# Patient Record
Sex: FEMALE | Race: WHITE | Employment: FULL TIME | URBAN - METROPOLITAN AREA
[De-identification: names, ages, dates, MRNs, and addresses within clinical notes are randomized per-mention and may not be internally consistent; named-entity substitution may affect disease eponyms.]

---

## 2018-08-24 ENCOUNTER — APPOINTMENT (OUTPATIENT)
Dept: GENERAL RADIOLOGY | Age: 28
End: 2018-08-24
Attending: EMERGENCY MEDICINE
Payer: OTHER GOVERNMENT

## 2018-08-24 ENCOUNTER — HOSPITAL ENCOUNTER (EMERGENCY)
Age: 28
Discharge: HOME | End: 2018-08-24
Attending: EMERGENCY MEDICINE
Payer: OTHER GOVERNMENT

## 2018-08-24 ENCOUNTER — APPOINTMENT (OUTPATIENT)
Dept: CT IMAGING | Age: 28
End: 2018-08-24
Attending: EMERGENCY MEDICINE
Payer: OTHER GOVERNMENT

## 2018-08-24 VITALS
DIASTOLIC BLOOD PRESSURE: 60 MMHG | BODY MASS INDEX: 21.16 KG/M2 | HEART RATE: 97 BPM | WEIGHT: 115 LBS | HEIGHT: 62 IN | OXYGEN SATURATION: 100 % | RESPIRATION RATE: 16 BRPM | SYSTOLIC BLOOD PRESSURE: 107 MMHG | TEMPERATURE: 98.7 F

## 2018-08-24 DIAGNOSIS — R10.13 ABDOMINAL PAIN, EPIGASTRIC: Primary | ICD-10-CM

## 2018-08-24 LAB
ALBUMIN SERPL-MCNC: 4.2 G/DL (ref 3.4–5)
ALBUMIN/GLOB SERPL: 1.4 {RATIO} (ref 0.8–1.7)
ALP SERPL-CCNC: 55 U/L (ref 45–117)
ALT SERPL-CCNC: 46 U/L (ref 13–56)
ANION GAP SERPL CALC-SCNC: 7 MMOL/L (ref 3–18)
APPEARANCE UR: ABNORMAL
AST SERPL-CCNC: 23 U/L (ref 15–37)
BACTERIA URNS QL MICRO: ABNORMAL /HPF
BASOPHILS # BLD: 0 K/UL (ref 0–0.1)
BASOPHILS NFR BLD: 0 % (ref 0–2)
BILIRUB DIRECT SERPL-MCNC: 0.2 MG/DL (ref 0–0.2)
BILIRUB SERPL-MCNC: 0.7 MG/DL (ref 0.2–1)
BILIRUB UR QL: NEGATIVE
BUN SERPL-MCNC: 10 MG/DL (ref 7–18)
BUN/CREAT SERPL: 13 (ref 12–20)
CALCIUM SERPL-MCNC: 8.4 MG/DL (ref 8.5–10.1)
CHLORIDE SERPL-SCNC: 110 MMOL/L (ref 100–108)
CO2 SERPL-SCNC: 27 MMOL/L (ref 21–32)
COLOR UR: ABNORMAL
CREAT SERPL-MCNC: 0.76 MG/DL (ref 0.6–1.3)
DIFFERENTIAL METHOD BLD: ABNORMAL
EOSINOPHIL # BLD: 0 K/UL (ref 0–0.4)
EOSINOPHIL NFR BLD: 0 % (ref 0–5)
EPITH CASTS URNS QL MICRO: ABNORMAL /LPF (ref 0–5)
ERYTHROCYTE [DISTWIDTH] IN BLOOD BY AUTOMATED COUNT: 13.4 % (ref 11.6–14.5)
GLOBULIN SER CALC-MCNC: 3.1 G/DL (ref 2–4)
GLUCOSE SERPL-MCNC: 83 MG/DL (ref 74–99)
GLUCOSE UR STRIP.AUTO-MCNC: NEGATIVE MG/DL
HCG UR QL: NEGATIVE
HCT VFR BLD AUTO: 44.5 % (ref 35–45)
HGB BLD-MCNC: 14.9 G/DL (ref 12–16)
HGB UR QL STRIP: ABNORMAL
KETONES UR QL STRIP.AUTO: NEGATIVE MG/DL
LEUKOCYTE ESTERASE UR QL STRIP.AUTO: ABNORMAL
LIPASE SERPL-CCNC: 187 U/L (ref 73–393)
LYMPHOCYTES # BLD: 0.6 K/UL (ref 0.9–3.6)
LYMPHOCYTES NFR BLD: 6 % (ref 21–52)
MCH RBC QN AUTO: 29.4 PG (ref 24–34)
MCHC RBC AUTO-ENTMCNC: 33.5 G/DL (ref 31–37)
MCV RBC AUTO: 87.9 FL (ref 74–97)
MONOCYTES # BLD: 0.8 K/UL (ref 0.05–1.2)
MONOCYTES NFR BLD: 8 % (ref 3–10)
MUCOUS THREADS URNS QL MICRO: ABNORMAL /LPF
NEUTS SEG # BLD: 8.9 K/UL (ref 1.8–8)
NEUTS SEG NFR BLD: 86 % (ref 40–73)
NITRITE UR QL STRIP.AUTO: NEGATIVE
PH UR STRIP: 5.5 [PH] (ref 5–8)
PLATELET # BLD AUTO: 178 K/UL (ref 135–420)
PMV BLD AUTO: 10.6 FL (ref 9.2–11.8)
POTASSIUM SERPL-SCNC: 3.7 MMOL/L (ref 3.5–5.5)
PROT SERPL-MCNC: 7.3 G/DL (ref 6.4–8.2)
PROT UR STRIP-MCNC: ABNORMAL MG/DL
RBC # BLD AUTO: 5.06 M/UL (ref 4.2–5.3)
RBC #/AREA URNS HPF: ABNORMAL /HPF (ref 0–5)
SODIUM SERPL-SCNC: 144 MMOL/L (ref 136–145)
SP GR UR REFRACTOMETRY: 1.03 (ref 1–1.03)
UROBILINOGEN UR QL STRIP.AUTO: 0.2 EU/DL (ref 0.2–1)
WBC # BLD AUTO: 10.3 K/UL (ref 4.6–13.2)
WBC URNS QL MICRO: ABNORMAL /HPF (ref 0–4)

## 2018-08-24 PROCEDURE — 74011250637 HC RX REV CODE- 250/637: Performed by: EMERGENCY MEDICINE

## 2018-08-24 PROCEDURE — 74011000250 HC RX REV CODE- 250: Performed by: EMERGENCY MEDICINE

## 2018-08-24 PROCEDURE — 74022 RADEX COMPL AQT ABD SERIES: CPT

## 2018-08-24 PROCEDURE — 80076 HEPATIC FUNCTION PANEL: CPT | Performed by: EMERGENCY MEDICINE

## 2018-08-24 PROCEDURE — 83690 ASSAY OF LIPASE: CPT | Performed by: EMERGENCY MEDICINE

## 2018-08-24 PROCEDURE — 85025 COMPLETE CBC W/AUTO DIFF WBC: CPT | Performed by: EMERGENCY MEDICINE

## 2018-08-24 PROCEDURE — 80048 BASIC METABOLIC PNL TOTAL CA: CPT | Performed by: EMERGENCY MEDICINE

## 2018-08-24 PROCEDURE — 81001 URINALYSIS AUTO W/SCOPE: CPT | Performed by: EMERGENCY MEDICINE

## 2018-08-24 PROCEDURE — 99284 EMERGENCY DEPT VISIT MOD MDM: CPT

## 2018-08-24 PROCEDURE — 74177 CT ABD & PELVIS W/CONTRAST: CPT

## 2018-08-24 PROCEDURE — 74011636320 HC RX REV CODE- 636/320: Performed by: EMERGENCY MEDICINE

## 2018-08-24 PROCEDURE — 96374 THER/PROPH/DIAG INJ IV PUSH: CPT

## 2018-08-24 PROCEDURE — 81025 URINE PREGNANCY TEST: CPT

## 2018-08-24 PROCEDURE — 96375 TX/PRO/DX INJ NEW DRUG ADDON: CPT

## 2018-08-24 PROCEDURE — 74011250636 HC RX REV CODE- 250/636: Performed by: EMERGENCY MEDICINE

## 2018-08-24 RX ORDER — FAMOTIDINE 20 MG/1
20 TABLET, FILM COATED ORAL 2 TIMES DAILY
Qty: 20 TAB | Refills: 0 | Status: SHIPPED | OUTPATIENT
Start: 2018-08-24

## 2018-08-24 RX ORDER — FENTANYL CITRATE 50 UG/ML
50 INJECTION, SOLUTION INTRAMUSCULAR; INTRAVENOUS
Status: COMPLETED | OUTPATIENT
Start: 2018-08-24 | End: 2018-08-24

## 2018-08-24 RX ORDER — SUCRALFATE 1 G/10ML
1 SUSPENSION ORAL 4 TIMES DAILY
Qty: 414 ML | Refills: 0 | Status: SHIPPED | OUTPATIENT
Start: 2018-08-24

## 2018-08-24 RX ORDER — ONDANSETRON 4 MG/1
4 TABLET, ORALLY DISINTEGRATING ORAL
Qty: 15 TAB | Refills: 0 | Status: SHIPPED | OUTPATIENT
Start: 2018-08-24

## 2018-08-24 RX ORDER — MAG HYDROX/ALUMINUM HYD/SIMETH 200-200-20
30 SUSPENSION, ORAL (FINAL DOSE FORM) ORAL
Status: DISCONTINUED | OUTPATIENT
Start: 2018-08-24 | End: 2018-08-24 | Stop reason: HOSPADM

## 2018-08-24 RX ORDER — LIDOCAINE HYDROCHLORIDE 20 MG/ML
15 SOLUTION OROPHARYNGEAL AS NEEDED
Status: DISCONTINUED | OUTPATIENT
Start: 2018-08-24 | End: 2018-08-24 | Stop reason: HOSPADM

## 2018-08-24 RX ORDER — ONDANSETRON 2 MG/ML
4 INJECTION INTRAMUSCULAR; INTRAVENOUS
Status: COMPLETED | OUTPATIENT
Start: 2018-08-24 | End: 2018-08-24

## 2018-08-24 RX ADMIN — FENTANYL CITRATE 50 MCG: 50 INJECTION, SOLUTION INTRAMUSCULAR; INTRAVENOUS at 09:58

## 2018-08-24 RX ADMIN — IOPAMIDOL 71 ML: 612 INJECTION, SOLUTION INTRAVENOUS at 10:33

## 2018-08-24 RX ADMIN — ONDANSETRON 4 MG: 2 INJECTION, SOLUTION INTRAMUSCULAR; INTRAVENOUS at 08:17

## 2018-08-24 RX ADMIN — LIDOCAINE HYDROCHLORIDE 15 ML: 20 SOLUTION ORAL; TOPICAL at 08:45

## 2018-08-24 RX ADMIN — ALUMINUM HYDROXIDE, MAGNESIUM HYDROXIDE, AND SIMETHICONE 30 ML: 200; 200; 20 SUSPENSION ORAL at 08:45

## 2018-08-24 NOTE — DISCHARGE INSTRUCTIONS

## 2018-08-24 NOTE — ED TRIAGE NOTES
Patient reports abdominal pain that started at 2am. She stated she has had it before when she had an ulcer.

## 2018-08-24 NOTE — LETTER
NOTIFICATION RETURN TO WORK / SCHOOL 
 
8/24/2018 11:22 AM 
 
Ms. Dhruv Mitchell [address] [address] To Whom It May Concern: 
 
Carter Nettles is currently under the care of [facility] EMERGENCY DEPT. She will return to work/school on: 8/28/2018 If there are questions or concerns please have the patient contact our office. Sincerely, Carey Jordan PA-C

## 2018-08-24 NOTE — ED PROVIDER NOTES
EMERGENCY DEPARTMENT HISTORY AND PHYSICAL EXAM    8:48 AM      Date: 8/24/2018  Patient Name: Brian Mitchell    History of Presenting Illness     Chief Complaint   Patient presents with    Abdominal Pain         History Provided By: Patient    Chief Complaint: abd pain  Duration:  Hours PTA  Timing:  Waxing and Waning  Location: LUQ  Quality: n/a  Severity: Severe  Modifying Factors: No modifying or aggravating factors were reported. Associated Symptoms:  Nausea, diarrhea      Additional History (Context): 8:53 AM Tiffanie Long is a 29 y.o. female with h/o kidney stones and cholecysectomy who presents to ED complaining of severe waxing and waning abd pain with onset a few hours PTA. Says that when the pain comes it is strong and then when it leaves it \"leaves her body sore. \" Pt does report a similar pain when she had an ulcer four years ago. Reports her diarrhea episode was at 4 am. Denies taking any antiacids. No pain in the flank or burning while urinating. No modifying or aggravating factors were reported. No other concerns or symptoms at this time. PCP: None    Current Facility-Administered Medications   Medication Dose Route Frequency Provider Last Rate Last Dose    lidocaine (XYLOCAINE) 2 % viscous solution 15 mL  15 mL Mouth/Throat PRN Luz India, DO   15 mL at 08/24/18 0845    alum-mag hydroxide-simeth (MYLANTA) oral suspension 30 mL  30 mL Oral Q4H PRN Luz India, DO   30 mL at 08/24/18 0845       Past History     Past Medical History:  No past medical history on file. Past Surgical History:  Past Surgical History:   Procedure Laterality Date    HX CHOLECYSTECTOMY      HX GYN      C Section and fallopian tube removal       Family History:  No family history on file.     Social History:  Social History   Substance Use Topics    Smoking status: Never Smoker    Smokeless tobacco: Never Used    Alcohol use Not on file       Allergies:  No Known Allergies      Review of Systems Review of Systems   Constitutional: Negative for chills and fever. HENT: Negative for ear pain and sore throat. Eyes: Negative for pain and visual disturbance. Respiratory: Negative for cough and shortness of breath. Cardiovascular: Negative for chest pain and palpitations. Gastrointestinal: Positive for abdominal pain, diarrhea and nausea. Negative for vomiting. Genitourinary: Negative for flank pain. Musculoskeletal: Negative for back pain and neck pain. Neurological: Negative for syncope and headaches. Psychiatric/Behavioral: Negative for agitation. The patient is not nervous/anxious. Physical Exam     Visit Vitals    /69 (BP 1 Location: Right arm, BP Patient Position: At rest)    Pulse 97    Temp 98.7 °F (37.1 °C)    Resp 16    Ht 5' 2\" (1.575 m)    Wt 52.2 kg (115 lb)    SpO2 100%    BMI 21.03 kg/m2         Physical Exam   Constitutional: She is oriented to person, place, and time. She appears well-developed and well-nourished. HENT:   Head: Normocephalic and atraumatic. Mouth/Throat: Oropharynx is clear and moist.   Eyes: Pupils are equal, round, and reactive to light. No scleral icterus. Neck: Neck supple. No tracheal deviation present. Cardiovascular: Regular rhythm. No murmur heard. Pulmonary/Chest: Effort normal and breath sounds normal. No respiratory distress. Abdominal: Soft. There is generalized tenderness. There is guarding. Musculoskeletal: Normal range of motion. She exhibits no deformity. Neurological: She is alert and oriented to person, place, and time. No gross neuro deficit   Skin: Skin is warm and dry. No rash noted. She is not diaphoretic. Psychiatric: She has a normal mood and affect. Nursing note and vitals reviewed. Diagnostic Study Results     Labs -  Labs Reviewed   CBC WITH AUTOMATED DIFF - Abnormal; Notable for the following:        Result Value    NEUTROPHILS 86 (*)     LYMPHOCYTES 6 (*)     ABS.  NEUTROPHILS 8.9 (*)     ABS. LYMPHOCYTES 0.6 (*)     All other components within normal limits   METABOLIC PANEL, BASIC - Abnormal; Notable for the following:     Chloride 110 (*)     Calcium 8.4 (*)     All other components within normal limits   URINALYSIS W/ RFLX MICROSCOPIC - Abnormal; Notable for the following:     Protein TRACE (*)     Blood LARGE (*)     Leukocyte Esterase TRACE (*)     All other components within normal limits   URINE MICROSCOPIC ONLY - Abnormal; Notable for the following:     Bacteria 3+ (*)     Mucus 3+ (*)     All other components within normal limits   LIPASE   HEPATIC FUNCTION PANEL   HCG URINE, QL. - POC       Radiologic Studies -   XR ABD ACUTE W 1 V CHEST   Final Result      CT ABD PELV W CONT    (Results Pending)         Medical Decision Making   I am the first provider for this patient. I reviewed the vital signs, available nursing notes, past medical history, past surgical history, family history and social history. Vital Signs-Reviewed the patient's vital signs. Pulse Oximetry Analysis -  100% on room air - stable    Records Reviewed: Nursing Notes (Time of Review: 8:48 AM)      MDM, Progress Notes, Reevaluation, and Consults:    ED Course   Comment By Time   29F with epigastric abd pain and diffuse TTP on exam, hx cholecystectomy, normal vitals borderline low SBP, non-toxic. Reports hx ulcer with similar sxs today, will tx symptomatically and re-evaluate after labs and tx to have a shared decision making conversation about CT since pt is young and has 1-2 CT's of her abd in the past. Luz Osborne,  08/24 0908   Pt tells me now she has a history of a perforated ulcer and was hospitalized for 4 days. Pain feels similar so will get an acute abd series to eval for free air prior to CT.  Luz Osborne,  08/24 9477       The patient was given:  Medications   lidocaine (XYLOCAINE) 2 % viscous solution 15 mL (15 mL Mouth/Throat Given 8/24/18 9863)   alum-mag hydroxide-simeth (MYLANTA) oral suspension 30 mL (30 mL Oral Given 8/24/18 0845)   ondansetron (ZOFRAN) injection 4 mg (4 mg IntraVENous Given 8/24/18 0817)   fentaNYL citrate (PF) injection 50 mcg (50 mcg IntraVENous Given 8/24/18 0958)   iopamidol (ISOVUE 300) 61 % contrast injection 100 mL (71 mL IntraVENous Given 8/24/18 1033)       Diagnosis     Clinical Impression:   1. Abdominal pain, epigastric        Disposition: 10:39 AM : Pt care transferred to Ely Landau ,ED provider. History of patient complaint(s), available diagnostic reports and current treatment plan has been discussed thoroughly. Bedside rounding on patient occured : yes . Intended disposition of patient : TBD  Pending diagnostics reports and/or labs (please list): Waiting on CT of Abd        Follow-up Information     Follow up With Details Comments Contact Info    [facility] EMERGENCY DEPT  If symptoms worsen [address]  [phone]    None  Please make an appoitnment to follow-up with a GI specialist for EGD testing None [phone] Patient stated that they have no PCP             Patient's Medications   Start Taking    No medications on file   Continue Taking    No medications on file   These Medications have changed    No medications on file   Stop Taking    HYDROCODONE-ACETAMINOPHEN (NORCO) 5-325 MG PER TABLET    Take 1 Tab by mouth every six (6) hours as needed for Pain. Max Daily Amount: 4 Tabs. ONDANSETRON (ZOFRAN ODT) 4 MG DISINTEGRATING TABLET    Take 1 Tab by mouth every eight (8) hours as needed for Nausea.     _______________________________    Emmy Easley acting as a scribe for and in the presence of Lorraine Linares,       August 24, 2018 at 10:39 AM       Provider Attestation:      I personally performed the services described in the documentation, reviewed the documentation, as recorded by the scribe in my presence, and it accurately and completely records my words and actions.  August 24, 2018 at 10:39 AM - Zana Browne DO

## 2018-11-19 NOTE — ED NOTES
10:40 AM :Pt care assumed from Dr. Shruthi Sifuentes , ED provider. Pt complaint(s), current treatment plan, progression and available diagnostic results have been discussed thoroughly. Rounding occurred: yes  Intended Disposition: TBD   Pending diagnostic reports and/or labs (please list): CT abd results    11:32 AM Completed rounding on the patient, discussed findings with her. She states that her abd pain since arriving has much improved. Original pain was a 9/10 and is currently a 4/10. Gave her the precautions for returning in 8-24 hours if her sx worsening. Gave pt rx for antiemetics, Carafate and Pepcid. Asked her to make an appt for a follow up with GI specialists through her [facility]. She agrees with the plan and has no further questions. Scribe Attestation     [facility] acting as a scribe for and in the presence of Anish Angel     August 24, 2018 at 10:40 AM       Provider Attestation:      I personally performed the services described in the documentation, reviewed the documentation, as recorded by the scribe in my presence, and it accurately and completely records my words and actions.  August 24, 2018 at 10:40 AM -WALESKA Rabago Spoke with patient, verified pharmacy, advised referral was entered, patient states understanding.

## 2019-08-20 ENCOUNTER — HOSPITAL ENCOUNTER (OUTPATIENT)
Dept: PHYSICAL THERAPY | Age: 29
Discharge: HOME | End: 2019-08-20
Payer: OTHER GOVERNMENT

## 2019-08-20 PROCEDURE — 97161 PT EVAL LOW COMPLEX 20 MIN: CPT

## 2019-08-20 PROCEDURE — 97530 THERAPEUTIC ACTIVITIES: CPT

## 2019-08-20 NOTE — PROGRESS NOTES
[facility]  [address] [place] [address]  [phone] fax    Plan of Care/ Statement of Necessity for Physical Therapy Services    Patient name: Anthoney Favre Start of Care: 2019   Referral source: Remedios Dumont : 1990    Medical Diagnosis: Vaginismus [N94.2]  Payor:  / Plan: Errol Pozo 74 / Product Type:  /  Onset Date: aggravated about 2-3 months    Treatment Diagnosis: PFD/abdominal pain   Prior Hospitalization: see medical history Provider#: [id number]   Medications: Verified on Patient summary List    Comorbidities: , gallbladder removal, fallopian tubes removed   Prior Level of Function: less pain, ind with all mobility, cook in [address] and following information is based on the information from the initial evaluation. Assessment/ beckwith information: Ms. Anthoney Favre is a 35 y/o, F who present with c/o PFD/abdominal pain. Pain/problem started about 3-4 years ago with no triggering event. . Pain locates at lower abdominal region, worst with working out and sexual relations, especially deep penetration. Pt notices more pain with Right side and occasional spasm/cramping. Pt also experiences occasional back pain due to avoiding activities or positionings that create abodminal pain. Pt denies any other problem with bowel or bladder. Pt reports 2 childbirths:  with first one in  and natural with 2nd in .  Evaluation reveals patient with musculoskeletal screen mod tightness of B piriformis, fair core strength with mod trunk rotations during SLR. Also notices Left upslip, min Left on Left sacral rotation and min PI but pt reports no back pain today. Internal assessment held until next visit per patient request and active menses. Patient may benefit from physical therapy to address PFD to improve QOL.  Pt may highly benefit from TENS unit to improve tolerance for ADLs/job duties. Evaluation Complexity History HIGH Complexity :3+ comorbidities / personal factors will impact the outcome/ POC ; Examination MEDIUM Complexity : 3 Standardized tests and measures addressing body structure, function, activity limitation and / or participation in recreation  ;Presentation LOW Complexity : Stable, uncomplicated  ;Clinical Decision Making MEDIUM Complexity : FOTO score of 26-74  Overall Complexity Rating: LOW     Problem List: Pelvic pain/dysfunction, Decreased pelvic floor mm awareness, Decreased pelvic floor mm strength, Use of accessory muscles, Improper voiding habits and Hypertonus of pelvic floor    Treatment Plan may include any combination of the following:   Therapeutic exercise, Urge suppression techniques, Neuromuscular re-education, Manual therapy, Physical agent/modality and Patient education  Patient / Family readiness to learn indicated by: asking questions, trying to perform skills and interest    Persons(s) to be included in education: patient (P) and family support person (FSP);list pt's     Barriers to Learning/Limitations: None    Patient Goal (s): to no longer be in constant pain    Patient Self Reported Health Status: fair    Rehabilitation Potential: good    Short Term Goals: To be accomplished in 4  weeks:  1. Patient will demonstrate home exercise program accurately as adjunct to PT clinic visits to promote healthy lifestyle and increase quality of life. Status @ Eval: initiated with stretching and relaxation techniques    2. Patient will report ability to utilize Dilator or [place] progressively with no pain to promote decrease of symptoms and increase quality of life. Status @ Eval: provide as indicated      3. Patient will report at least 25% improvement with overall pain and cramping/spasm to improve her QOL. Status @ Eval: 4-8/10 at worst     Long Term Goals: To be accomplished in 8  weeks:  1.  Patient will have FOTO score for PFDI Pain decreased by 5-8% points indicating improvement in function and report of no difficulty with maintaining intimate relations due to pain. Status @ Eval: 21    2. Patient will report at least 75% improvement with overall pain and cramping/spasm to improve her QOL. Status @ Eval: 4-8/10 at worst    3. Patient will be able to perform plank for at least 20 seconds each directions to improve core strength for prolonged standing/amb during working. Status @ Eval: will initiate next visit     5. Patient will be independent with HEP at time of discharge to be able to continue with pelvic floor program.  Status @ Eval: initiated with stretching and relaxation techniques     Frequency / Duration: Patient to be seen 1-2 times per week for 8 weeks. Patient/ Caregiver education and instruction: Diagnosis, prognosis, Proper Voiding Habits, Diet, Pain Management, Exercises and Bladder Retraining    Connie Chaudhry, PT 8/20/2019 1:50 PM    ________________________________________________________________________    I certify that the above Therapy Services are being furnished while the patient is under my care. I agree with the treatment plan and certify that this therapy is necessary.     Physician's Signature:____________Date:_________TIME:________    ** Signature, Date and Time must be completed for valid certification **      Please sign and return to [facility]   [address] [place] [address]  [phone] fax

## 2019-08-20 NOTE — PROGRESS NOTES
PF Daily Treatment Note  Patient Name: Rosendo Mitchell  Date:2019  []  Patient  Verified  Insurance:Payor:  / Plan: Errol Pozo [id number] / Product Type:  /   In time: 12:58  Out time:1:38  Total Treatment Time (min): 40  Total Timed Codes (min): 23  1:1 Treatment Time ( only): 40   Visit #: 1 of 8-16    Treatment Area: [x] Pelvic Floor     [] Other:  SUBJECTIVE  Pain Level (0-10 scale): 5/10  Any medication changes, allergies to medications, adverse drug reactions, diagnosis change, or new procedure performed?: [x] No    [] Yes (see summary sheet for update)    Ms. Pedro Damon is a 33 y/o, F who present with c/o PFD/abdominal pain. Pain/problem started about 3-4 years ago with no triggering event. . Pain locates at lower abdominal region, worst with working out and sexual relations, especially deep penetration. Pt notices more pain with Right side and occasional spasm/cramping. Pt also experiences occasional back pain due to avoiding activities or positionings that create abodminal pain. Pt denies any other problem with bowel or bladder. Pt reports 2 childbirths:  with first one in  and natural with 2nd in . Pelvic Floor Dysfunction Evaluation     mod tightness of B piriformis, fair core strength with mod trunk rotations during SLR. Also notices Left upslip, min Left on Left sacral rotation and min PI but pt reports no back pain today. Musculoskeletal Screen:    Skin Integrity:  [] Healthy [] Red  [] Labia Atrophy [] Fragile    Sensation: [] Intact [] Diminished:    Muscle Bulk: [] Symmetrical  [] Well-developed [] Atrophied:  []L   []R   []B    Prolapse: [] Cystocele:   [] Rectocele:    PERF Score (Performance/Endurance/Repetitions/Flicks)   P: E: R: F: Total:    Patient has failed previous pelvic floor muscle training?   [] Yes    [] No    EMG Evaluation:  [] N/A [] Deferred secondary to:    Channel A: Electrode type:  [] Internal    [] Surface    [] Vaginal [] Rectal  Channel B: Electrode location:    Baseline Resting Tone (1 minute)  Channel A (microvolts): Quality:  [] Normal [] Irradic [] Elevated  Channel B (microvolts): Slow Twitch: (10 second hold, 20 second rest)  Channel A (microvolts): Quality:[] Quick/slow rise [] Low net rise  Net rise (microvolts):   [] Slow Relaxation [] Incoordination       [] Unable to contract [] Fatigues at (sec):       [] Elevated baseline between contractions    Channel B (microvolts): Use of Accessory Muscles: [] Minimal  Net rise (microvolts):   [] Moderate  [] Excessive       [] No use of accessory muscles    Fast Twitch (3 second hold, 10 second rest)  Channel A (microvolts): Quality:[] Quick/slow rise [] Low net rise  Net rise (microvolts):   [] Slow Relaxation [] Incoordination       [] Unable to contract [] Fatigues at (sec):       [] Elevated baseline between contractions    Channel B (microvolts):  Use of Accessory Muscles: [] Minimal  Net rise (microvolts):   [] Moderate  [] Excessive       [] No use of accessory muscles    Optional Tests:  Lower abdominal strength: /5    Comments/Additional Tests:      OBJECTIVE      17 min Evaluation    23 min Therapeutic Activity:  []  See flow sheet :Pt edu within scope of practice on prognosis, POC, PF muscles anatomy/physiology, PF PT, modalities use, reviewed HEP   Rationale: increase ROM, increase strength, improve coordination and increase proprioception  to improve the patients ability to improve tolerance for ADLs/job duties           min Patient Education: [x] Review HEP    [] Progressed/Changed HEP based on:   [] positioning   [] body mechanics   [] transfers   [] heat/ice application        Other Objective/Functional Measures:   []baseline resting tone:   []slow twitch mms   []fast twitch mms    Pain Level (0-10 scale) post treatment: 5/10    ASSESSMENT/Changes in Function: see POC please    []  Decrease # of leaks   [] No change []  Improving [] Resolved     []  Decrease hypertonus [] No change []  Improving [] Resolved     []  Increase void interval [] No change []  Improving [] Resolved     []  Increase PF strength [] No change []  Improving [] Resolved     []  Increase PF endurance [] No change []  Improving [] Resolved     []  Increase endurance [] No change []  Improving [] Resolved     []  Decrease # of pads [] No change []  Improving [] Resolved     []  Decrease pain [] No change []  Improving [] Resolved       Patient will continue to benefit from skilled PT services to modify and progress therapeutic interventions, address functional mobility deficits, address ROM deficits, address strength deficits, analyze and address soft tissue restrictions, analyze and cue movement patterns, analyze and modify body mechanics/ergonomics, assess and modify postural abnormalities and instruct in home and community integration to attain remaining goals.      [x]  See Plan of Care         PLAN  []  Upgrade activities as tolerated     []  Continue plan of care  []  Update interventions per flow sheet       []  Discharge due to:_  []  Other:_      Abbie Milligan PT 8/20/2019  1:01 PM

## 2019-08-30 ENCOUNTER — HOSPITAL ENCOUNTER (OUTPATIENT)
Dept: PHYSICAL THERAPY | Age: 29
Discharge: HOME | End: 2019-08-30
Payer: OTHER GOVERNMENT

## 2019-08-30 PROCEDURE — 97110 THERAPEUTIC EXERCISES: CPT

## 2019-08-30 PROCEDURE — 97530 THERAPEUTIC ACTIVITIES: CPT

## 2019-08-30 NOTE — PROGRESS NOTES
PF DAILY TREATMENT NOTE 3-16    Patient Name: Prakash Mitchell  Date:2019  : 1990  [x]  Patient  Verified  Payor:  / Plan: [facility] / Product Type:  /    In time: 11:49  Out time:12:27  Total Treatment Time (min): 38  Visit #: 2 of     Treatment Area: [x] Pelvic Floor     [] Other:    SUBJECTIVE  Pain Level (0-10 scale): 6/10  Any medication changes, allergies to medications, adverse drug reactions, diagnosis change, or new procedure performed?: [x] No    [] Yes (see summary sheet for update)  Subjective functional status/changes:   [] No changes reported  Pt reports most pain locates along her Right quadrant today    She notices back pain increased with restorative t/s and supine cobbler    Pt denies any other bowel and bladder problem     OBJECTIVE    10 min Therapeutic Exercise:  [x] See flow sheet :   []  Pelvic floor strengthening                 []  Pelvic floor downtraining  []  Quality pelvic floor contractions       [x]  Relaxation techniques  []  Urge suppression exercises  []  Other:  Rationale: increase ROM, increase strength, improve coordination and increase proprioception  to improve the patients ability to improve tolerance for ADLs       28 min Therapeutic Activity:  [x]  See flow sheet :    []  Increase Tissue extensibility        []  Assess fiber intake    [x]  Assess voiding habits  [x]  Assess bowel habits  [x]  Other: assess PF muscles   Rationale: increase ROM, increase strength, improve coordination and increase proprioception  to improve the patients ability to improve ease with ADLs/sexual relation          With   [] TE   [] TA   [] neuro  [] manual   [] other: Patient Education: [x] Review HEP    [] Progressed/Changed HEP based on:   [] positioning   [] body mechanics   [] transfers   [] heat/ice application    [] other:      Other Objective/Functional Measures:   []baseline resting tone:   []slow twitch mms   []fast twitch mms    Musculoskeletal Screen:     Skin Integrity:  [x] Healthy           [] Red                 [] Labia Atrophy [] Fragile     Sensation:       [x] Intact  [] Diminished:     Muscle Bulk:    [x] Symmetrical  [] Well-developed           [] Atrophied:  []L   []R   []B     Prolapse:   none noticed      [] Cystocele:                              [] Rectocele:     PERF Score (Performance/Endurance/Repetitions/Flicks): paradoxical contraction; min increased tone after contraction              P:         E:         R:         F:         Total:       Pain Level (0-10 scale) post treatment: 4-5/10     ASSESSMENT/Changes in Function: pt present with mod tones of PF, min pain/presssure with palpation. She demonstrates paradoxical contraction with PF muscles. improved tolerance and relaxation with supine cobbler and sphinx. Will cont with relaxation, internal manual and ESTIM next visit.      []  Decrease # of leaks   [] No change []  Improving [] Resolved     []  Decrease hypertonus [] No change []  Improving [] Resolved     []  Increase void interval [] No change []  Improving [] Resolved     []  Increase PF strength [] No change []  Improving [] Resolved     []  Increase PF endurance [] No change []  Improving [] Resolved     []  Increase endurance [] No change []  Improving [] Resolved     []  Decrease # of pads [] No change []  Improving [] Resolved     []  Decrease pain [] No change []  Improving [] Resolved     []  Increased coordination [] No change []  Improving [] Resolved     []  Increased Bowel Frequency [] No change []  Improving [] Resolved       Patient will continue to benefit from skilled PT services to modify and progress therapeutic interventions, address functional mobility deficits, address ROM deficits, address strength deficits, analyze and address soft tissue restrictions, analyze and cue movement patterns, analyze and modify body mechanics/ergonomics, assess and modify postural abnormalities and instruct in home and community integration to attain remaining goals. [x]  See Plan of Care  []  See progress note/recertification  []  See Discharge Summary         Progress towards goals / Updated goals:  Short Term Goals: To be accomplished in 4  weeks:  1. Patient will demonstrate home exercise program accurately as adjunct to PT clinic visits to promote healthy lifestyle and increase quality of life. Status @ Eval: initiated with stretching and relaxation techniques     2. Patient will report ability to utilize Dilator or Aquino Leighann progressively with no pain to promote decrease of symptoms and increase quality of life. Status @ Eval: provide as indicated       3. Patient will report at least 25% improvement with overall pain and cramping/spasm to improve her QOL. Status @ Eval: 4-8/10 at worst     Long Term Goals: To be accomplished in [facility]:  1. Patient will have FOTO score for PFDI Pain decreased by 5-8% points indicating improvement in function and report of no difficulty with maintaining intimate relations due to pain. Status @ Eval: 21     2. Patient will report at least 75% improvement with overall pain and cramping/spasm to improve her QOL. Status @ Eval: 4-8/10 at worst     3. Patient will be able to perform plank for at least 20 seconds each directions to improve core strength for prolonged standing/amb during working. Status @ Eval: will initiate next visit      5.  Patient will be independent with HEP at time of discharge to be able to continue with pelvic floor program.  Status @ Eval: initiated with stretching and relaxation techniques     PLAN  [x]  Upgrade activities as tolerated     [x]  Continue plan of care  []  Update interventions per flow sheet       []  Discharge due to:_  []  Other:_      Darvin Duran, PT 8/30/2019  9:48 AM    Future Appointments   Date Time Provider Chepe Xie   8/30/2019 11:45 AM Wilder Bates MMCPTPB [facility]   9/4/2019  1:00 PM Wilder Bates [id number] [facility]   9/9/2019 10:00 AM Radha Courser [facility]CP[facility]   9/18/2019 12:15 PM Radha Courser ELNZFVO [facility]   9/23/2019 10:00 AM Radha Courser WTSIWIU [facility]   9/30/2019 10:00 AM Radha Courser [facility]CP[facility]   10/7/2019 10:00 AM Radha Courser [facility]CPTPB [facility]   10/14/2019 10:45 AM Анна Avalos [facility]CPTPB [facility]

## 2019-09-04 ENCOUNTER — HOSPITAL ENCOUNTER (OUTPATIENT)
Dept: PHYSICAL THERAPY | Age: 29
Discharge: HOME | End: 2019-09-04
Payer: OTHER GOVERNMENT

## 2019-09-04 PROCEDURE — 97112 NEUROMUSCULAR REEDUCATION: CPT

## 2019-09-04 PROCEDURE — 97014 ELECTRIC STIMULATION THERAPY: CPT

## 2019-09-04 PROCEDURE — 97110 THERAPEUTIC EXERCISES: CPT

## 2019-09-04 NOTE — PROGRESS NOTES
PF DAILY TREATMENT NOTE 3-16    Patient Name: Arti Henning  Date:2019  : 1990  [x]  Patient  Verified  Payor:  / Plan: [facility] / Product Type:  /    In time: 1:05  Out time: 1:50  Total Treatment Time (min): 45  Visit #: 3 of     Treatment Area: [x] Pelvic Floor     [] Other:    SUBJECTIVE  Pain Level (0-10 scale): 5/10  Any medication changes, allergies to medications, adverse drug reactions, diagnosis change, or new procedure performed?: [x] No    [] Yes (see summary sheet for update)  Subjective functional status/changes:   [] No changes reported  Pt reports min soreness after back PT; her PT found out that she has some alignment issue with Left upslip    Pt reports that she can feel her PF bearing down while trying to perform contraction; however she can't correct herself    OBJECTIVE    Modality rationale: decrease pain and increase tissue extensibility to improve the patients ability to tolerate ADLs   Min Type Additional Details   10 with se up time [x] Estim:  []Unatt       [x]IFC  []Premod                        []Other:  []w/ice   [x]w/heat  Position: sitting  Location: back    [] Estim: []Att    []TENS instruct  []NMES                    []Other:  []w/US   []w/ice   []w/heat  Position:  Location:    []  Ultrasound: []Continuous   [] Pulsed                           []1MHz   []3MHz Position:  Location:    []  Ice     []  heat  []  Ice massage  []  Laser   []  Anodyne Position:  Location:   [] Skin assessment post-treatment:  []intact []redness- no adverse reaction    []redness  adverse reaction:       10 min Therapeutic Exercise:  [x] See flow sheet :   []  Pelvic floor strengthening                 []  Pelvic floor downtraining  []  Quality pelvic floor contractions       [x]  Relaxation techniques  []  Urge suppression exercises  []  Other:  Rationale: increase ROM, increase strength, improve coordination and increase proprioception  to improve the patients ability to improve tolerance for ADLs                25 min Neuromuscular Re-education:  [x]  See flow sheet :   []  Pelvic floor strengthening                 []  Pelvic floor downtraining  []  Quality pelvic floor contractions       [x]  Relaxation techniques  []  Urge suppression exercises  []  Other:  Core strengthening  Rationale: increase ROM, increase strength, improve coordination, improve balance and increase proprioception  to improve the patients ability to improve ease with ADLs    With   [] TE   [] TA   [] neuro  [] manual   [] other: Patient Education: [x] Review HEP    [] Progressed/Changed HEP based on:   [] positioning   [] body mechanics   [] transfers   [] heat/ice application    [] other:      Other Objective/Functional Measures:   []baseline resting tone:   []slow twitch mms   fast twitch mms  LOB multiple times on Oov and during side plank on Left  Also noticed deviation of LEs towards Right side; indicating decreased right core strength  Min challenged with relaxation    Pain Level (0-10 scale) post treatment: 4/10    ASSESSMENT/Changes in Function: pt demonstrates poor core strength but fairly good awareness of PF muscles. She notices that she always perform bearing down of PF muscles during contraction (squeeze but bear down instead of pulling PF/finger up). Pt pleased with TENS unit use today; deems to highly benefit from home unit to improved pain management. Will cont with core strengthening and PF relaxation for improved QOL.     []  Decrease # of leaks    [] No change []  Improving [] Resolved      []  Decrease hypertonus [] No change []  Improving [] Resolved      []  Increase void interval [] No change []  Improving [] Resolved      []  Increase PF strength [] No change []  Improving [] Resolved      []  Increase PF endurance [] No change []  Improving [] Resolved      []  Increase endurance [] No change []  Improving [] Resolved      []  Decrease # of pads [] No change [] Improving [] Resolved      []  Decrease pain [] No change []  Improving [] Resolved      []  Increased coordination [] No change []  Improving [] Resolved      []  Increased Bowel Frequency [] No change []  Improving [] Resolved         Patient will continue to benefit from skilled PT services to modify and progress therapeutic interventions, address functional mobility deficits, address ROM deficits, address strength deficits, analyze and address soft tissue restrictions, analyze and cue movement patterns, analyze and modify body mechanics/ergonomics, assess and modify postural abnormalities and instruct in home and community integration to attain remaining goals. [x]  See Plan of Care  []  See progress note/recertification  []  See Discharge Summary         Progress towards goals / Updated goals:  Short Term Goals: To be accomplished in 4  weeks:  1. Patient will demonstrate home exercise program accurately as adjunct to PT clinic visits to promote healthy lifestyle and increase quality of life. Status @ Eval: initiated with stretching and relaxation techniques  Current: good understanding 9-4-19     2. Patient will report ability to utilize Dilator or Therawand progressively with no pain to promote decrease of symptoms and increase quality of life. Status @ Eval: provide as indicated       3. Patient will report at least 25% improvement with overall pain and cramping/spasm to improve her QOL. Status @ Eval: 4-8/10 at worst     Long Term Goals: To be accomplished in 8  weeks:  1. Patient will have FOTO score for PFDI Pain decreased by 5-8% points indicating improvement in function and report of no difficulty with maintaining intimate relations due to pain. Status @ Eval: 21     2. Patient will report at least 75% improvement with overall pain and cramping/spasm to improve her QOL. Status @ Eval: 4-8/10 at worst     3.  Patient will be able to perform plank for at least 20 seconds each directions to improve core strength for prolonged standing/amb during working.   Status @ Eval: will initiate next visit      5. Patient will be independent with HEP at time of discharge to be able to continue with pelvic floor program.  Status @ Eval: initiated with stretching and relaxation techniques      PLAN  [x]  Upgrade activities as tolerated     [x]  Continue plan of care  []  Update interventions per flow sheet       []  Discharge due to:_  []  Other:_      Kate Coppola, PT 9/4/2019  9:50 AM    Future Appointments   Date Time Provider Chepe Xie   9/4/2019  1:00 PM Graciela Salle [facility]CPTPB [facility]   9/9/2019 10:00 AM Gillian Breath M MMCPTPB [facility]   9/18/2019 12:15 PM Gillian Breath M MMCPTPB [facility]   9/23/2019 10:00 AM Gillian Breath M MMCPTPB [facility]   9/30/2019 10:00 AM Graciela Salle [facility]   10/7/2019 10:00 AM Graciela Salle MMCPTPB [facility]   10/14/2019 10:45 AM Yasmni Esposito [facility]

## 2019-09-09 ENCOUNTER — HOSPITAL ENCOUNTER (OUTPATIENT)
Dept: PHYSICAL THERAPY | Age: 29
Discharge: HOME | End: 2019-09-09
Payer: OTHER GOVERNMENT

## 2019-09-09 PROCEDURE — 97140 MANUAL THERAPY 1/> REGIONS: CPT

## 2019-09-09 PROCEDURE — 97110 THERAPEUTIC EXERCISES: CPT

## 2019-09-09 NOTE — PROGRESS NOTES
PF DAILY TREATMENT NOTE 3-16    Patient Name: Ling Courtney  Date:2019  : 1990  [x]  Patient  Verified  Payor: ALYCIA / Plan: [facility] / Product Type: [employer] /    In time: 10:12  Out time: 10:44  Total Treatment Time (min): 32  Visit #: 4 of     Treatment Area: [x] Pelvic Floor     [] Other:    SUBJECTIVE  Pain Level (0-10 scale): 3/10  Any medication changes, allergies to medications, adverse drug reactions, diagnosis change, or new procedure performed?: [x] No    [] Yes (see summary sheet for update)  Subjective functional status/changes:   [] No changes reported  Pt reports having spotting today due to her birth     She recalls no problem with bowel or bladder      OBJECTIVE    23 min Therapeutic Exercise:  [x] See flow sheet :   []  Pelvic floor strengthening                 []  UDJNHM floor downtraining  []  Quality pelvic floor contractions       [x]  Relaxation techniques  []  Urge suppression exercises  []  Other:  Rationale: increase ROM, increase strength, improve coordination and increase proprioception  to improve the patients ability to improve tolerance for ADLs    9 min Manual Therapy:  MET to correct Left up slip, abdominal MFR   Rationale: decrease pain, increase ROM, increase tissue extensibility and decrease trigger points to improve tolerance for ADLs/sexual relation          With   [] TE   [] TA   [] neuro  [] manual   [] other: Patient Education: [x] Review HEP    [] Progressed/Changed HEP based on:   [] positioning   [] body mechanics   [] transfers   [] heat/ice application    [] other:      Other Objective/Functional Measures:   []baseline resting tone:   []slow twitch mms   []fast twitch mms   Pt was 11 min late   Improved form/edurance with plank    Pain Level (0-10 scale) post treatment: 0/10    ASSESSMENT/Changes in Function: deferred internal due to spotting. Pt demonstrates good tolerance with all core strengthening therex, no pain reported. Will cont with internal manual and PF down training next visit.      []  Decrease # of leaks    [] No change []  Improving [] Resolved      []  Decrease hypertonus [] No change []  Improving [] Resolved      []  Increase void interval [] No change []  Improving [] Resolved      []  Increase PF strength [] No change []  Improving [] Resolved      []  Increase PF endurance [] No change []  Improving [] Resolved      []  Increase endurance [] No change []  Improving [] Resolved      []  Decrease # of pads [] No change []  Improving [] Resolved      []  Decrease pain [] No change []  Improving [] Resolved      []  Increased coordination [] No change []  Improving [] Resolved      []  Increased Bowel Frequency [] No change []  Improving [] Resolved         Patient will continue to benefit from skilled PT services to modify and progress therapeutic interventions, address functional mobility deficits, address ROM deficits, address strength deficits, analyze and address soft tissue restrictions, analyze and cue movement patterns, analyze and modify body mechanics/ergonomics, assess and modify postural abnormalities and instruct in home and community integration to attain remaining goals.     [x]  See Plan of Care  []  See progress note/recertification  []  See Discharge Summary     Progress towards goals / Updated goals:  Short Term Goals: To be accomplished in 4  weeks:  1. Patient will demonstrate home exercise program accurately as adjunct to PT clinic visits to promote healthy lifestyle and increase quality of life. Status @ Eval: initiated with stretching and relaxation techniques  Current: good understanding 9-4-19     2. Patient will report ability to utilize Dilator or Therawand progressively with no pain to promote decrease of symptoms and increase quality of life. Status @ Eval: provide as indicated       3.  Patient will report at least 25% improvement with overall pain and cramping/spasm to improve her QOL.  Status @ Eval: 4-8/10 at worst  Current: progressing slowly 9-9-19     Long Term Goals: To be accomplished in 8  weeks:  1. Patient will have FOTO score for PFDI Pain decreased by 5-8% points indicating improvement in function and report of no difficulty with maintaining intimate relations due to pain. Status @ Eval: 21     2. Patient will report at least 75% improvement with overall pain and cramping/spasm to improve her QOL. Status @ Eval: 4-8/10 at worst     3. Patient will be able to perform plank for at least 20 seconds each directions to improve core strength for prolonged standing/amb during working.   Status @ Eval: will initiate next visit      5. Patient will be independent with HEP at time of discharge to be able to continue with pelvic floor program.  Status @ Eval: initiated with stretching and relaxation techniques      PLAN  [x]  Upgrade activities as tolerated     [x]  Continue plan of care  []  Update interventions per flow sheet       []  Discharge due to:_  []  Other:_      Gerard Way, PT 9/9/2019  9:47 AM    Future Appointments   Date Time Provider Chepe Xie   9/9/2019 10:00 AM Aureliano Bonus [facility]   9/18/2019 12:15 PM Gerardo SINGH [facility]CPTPB [facility]   9/23/2019 10:00 AM Gerardo SINGH [facility]B [facility]   9/30/2019 10:00 AM Aureliano Bonus [facility]CP[facility]   10/7/2019 10:00 AM Aureliano Bonus [facility]CPTPB [facility]   10/14/2019 10:45 AM Peter Thompson [facility]CPTPB [facility]

## 2019-09-18 ENCOUNTER — HOSPITAL ENCOUNTER (OUTPATIENT)
Dept: PHYSICAL THERAPY | Age: 29
End: 2019-09-18
Payer: OTHER GOVERNMENT

## 2019-09-23 ENCOUNTER — HOSPITAL ENCOUNTER (OUTPATIENT)
Dept: PHYSICAL THERAPY | Age: 29
Discharge: HOME | End: 2019-09-23
Payer: OTHER GOVERNMENT

## 2019-09-23 PROCEDURE — 97140 MANUAL THERAPY 1/> REGIONS: CPT

## 2019-09-23 PROCEDURE — 97110 THERAPEUTIC EXERCISES: CPT

## 2019-09-23 PROCEDURE — 97530 THERAPEUTIC ACTIVITIES: CPT

## 2019-09-23 NOTE — PROGRESS NOTES
[facility] Adelita Lesches  [address] [facility]  [phone] fax    Pelvic Floor Progress Note  Patient name: Miguel Marcus Start of Care: 2019   Referral source: Mann Mclaughlin : 1990               Medical Diagnosis: Vaginismus [N94.2]  Payor:  / Plan: [facility]  [place] / Product Type:  /  Onset Date: aggravated about 2-3 months               Treatment Diagnosis: PFD/abdominal pain   Prior Hospitalization: see medical history Provider#: [id number]   Medications: Verified on Patient summary List    Comorbidities: , gallbladder removal, fallopian tubes removed   Prior Level of Function: less pain, ind with all mobility, cook in [facility]          Visits from Start of Care: 5    Missed Visits: 1    Established Goals:           Excellent Good         Limited           None  [] Increase Pelvic MM strength []  []  []  []  [x] Decrease Pelvic MM hypertonus []  [x]  []  []  [x] Decrease Incontinence Episodes []  [x]  []  []   [] Improve Voiding Habits  []  []  []  []  [] Decreased Urgency   []  []  []  []    Key Functional Changes: improving pain gradually, improving coordination and strength of core, PF and hips; improving pain management    Updated Goals: to be achieved in 4 weeks:  1. Patient will have FOTO score for PFDI Pain decreased by 5-8% points indicating improvement in function and report of no difficulty with maintaining intimate relations due to pain. Status @ Eval: 21  Current: regressed significantly 29 19     2. Patient will report at least 75% improvement with overall pain and cramping/spasm to improve her QOL. Status @ Eval: 4-8/10 at worst  Current: 8/10 occasionaly 19     3. Patient will be able to perform plank for at least 25 seconds each directions to improve core strength for prolonged standing/amb during working.   Status @ Eval: will initiate next visit   Current: not met with full plank; mod shaking with side plank on Left due to Right core weakness? 9-23-19     5. Patient will be independent with HEP at time of discharge to be able to continue with pelvic floor program.  Status @ Eval: initiated with stretching and relaxation techniques   Current: good compliance per pt report 9-23-19    ASSESSMENT/RECOMMENDATIONS: pt making steady progress instead of regressing score with FOTO. Pt cont to experience pain with exercises, pain with sexual relations, and min leakage with sneezing, Pt present with decreased strength, endurance of core, deep hips and PF muscles contributing to pelvic mal-alignment. Pt would cont to benefit from skilled PT to address remained limitations for improve QOL. [x]Continue therapy per initial plan/protocol at a frequency of  1-2 x per week for 4 weeks  []Continue therapy with the following recommended changes:_____________________      _____________________________________________________________________  []Discontinue therapy progressing towards or have reached established goals  []Discontinue therapy due to lack of appreciable progress towards goals  []Discontinue therapy due to lack of attendance or compliance  []Await Physician's recommendations/decisions regarding therapy  []Other:________________________________________________________________    Thank you for this referral.   Jose Daniel Kidd, PT 9/23/2019 10:32 AM  NOTE TO PHYSICIAN:  PLEASE COMPLETE THE ORDERS BELOW AND   FAX TO [facility]: [phone]  If you are unable to process this request in 24 hours please contact our office: [phone] I have read the above report and request that my patient continue as recommended. ? I have read the above report and request that my patient continue therapy with the following changes/special instructions:___________________________________________________________  ?  I have read the above report and request that my patient be discharged from therapy.     [address] [facility] [place] Signature:____________Date:_________TIME:________    [facility], Date and Time must be completed for valid certification **

## 2019-09-23 NOTE — PROGRESS NOTES
PF DAILY TREATMENT NOTE 3-16    Patient Name: Naeem Mitchell  Date:2019  : 1990  [x]  Patient  Verified  Payor: ALYCIA / Plan: Errol Pozo [address] / Product Type:  /    In time: 10:00  Out time: 10:45  Total Treatment Time (min): 45  Visit #: 5 of       Treatment Area: [x] Pelvic Floor     [] Other:     SUBJECTIVE  Pain Level (0-10 scale): 0/10  Any medication changes, allergies to medications, adverse drug reactions, diagnosis change, or new procedure performed?: [x] No    [] Yes (see summary sheet for update)  Subjective functional status/changes:   [] No changes reported  Pt reports about 40% improvement so far. Her pain has improved gradually with decreased frequency; worst pain is still 8/10 occasionally.  Pt reports good compliance with all therex and very min leakage of urine during sneezing; no other problem with bowel or bladder     OBJECTIVE       25 min Therapeutic Exercise:  [x] See flow sheet :   []  Pelvic floor strengthening                 []  Pelvic floor downtraining  []  Quality pelvic floor contractions       [x]  Relaxation techniques  []  Urge suppression exercises  []  Other:  Rationale: increase ROM, increase strength, improve coordination and increase proprioception  to improve the patients ability to improve tolerance for ADLs     8 min Manual Therapy:  MET to correct Left up slip, Left PI vs/ Right AI and Left on Left rotation of sacrum    Rationale: decrease pain, increase ROM, increase tissue extensibility and decrease trigger points to improve tolerance for ADLs/sexual relation     12 min Therapeutic Activity:  []  See flow sheet :    []  Increase Tissue extensibility        []  Assess fiber intake    [x]  Assess voiding habits                      [x]  Assess bowel habits  []  Other:              Rationale: increase ROM, increase strength, improve coordination and increase proprioception  to improve the patients ability to improve leakage, improve tolerance for sexual relations.                                                                  With   [x] TE   [] TA   [] neuro  [] manual   [] other: Patient Education: [x] Review HEP    [] Progressed/Changed HEP based on:   [] positioning   [] body mechanics   [] transfers   [] heat/ice application    [] other:       Other Objective/Functional Measures:   []baseline resting tone:   []slow twitch mms   []fast twitch mms     Pain Level (0-10 scale) post treatment: 3/10 soreness from therex     ASSESSMENT/Changes in Function: see progress note please    []  Decrease # of leaks    [] No change []  Improving [] Resolved      []  Decrease hypertonus [] No change []  Improving [] Resolved      []  Increase void interval [] No change []  Improving [] Resolved      []  Increase PF strength [] No change []  Improving [] Resolved      []  Increase PF endurance [] No change []  Improving [] Resolved      []  Increase endurance [] No change []  Improving [] Resolved      []  Decrease # of pads [] No change []  Improving [] Resolved      []  Decrease pain [] No change []  Improving [] Resolved      []  Increased coordination [] No change []  Improving [] Resolved      []  Increased Bowel Frequency [] No change []  Improving [] Resolved         Patient will continue to benefit from skilled PT services to modify and progress therapeutic interventions, address functional mobility deficits, address ROM deficits, address strength deficits, analyze and address soft tissue restrictions, analyze and cue movement patterns, analyze and modify body mechanics/ergonomics, assess and modify postural abnormalities and instruct in home and community integration to attain remaining goals.     [x]  See Plan of Care  []  See progress note/recertification  []  See Discharge Summary     Progress towards goals / Updated goals:  Short Term Goals: To be accomplished in 4  weeks:  1.  Patient will demonstrate home exercise program accurately as adjunct to PT clinic visits to promote healthy lifestyle and increase quality of life. Status @ Eval: initiated with stretching and relaxation techniques  Current: good understanding 9-4-19     2. Patient will report ability to utilize Dilator or Therawand progressively with no pain to promote decrease of symptoms and increase quality of life. Status @ Eval: provide as indicated    Current: will provide as indicated     3. Patient will report at least 25% improvement with overall pain and cramping/spasm to improve her QOL. Status @ Eval: 4-8/10 at worst  Current: progressing slowly 9-9-19     Long Term Goals: To be accomplished in 8  weeks:  1. Patient will have FOTO score for PFDI Pain decreased by 5-8% points indicating improvement in function and report of no difficulty with maintaining intimate relations due to pain. Status @ Eval: 21  Current: regressed significantly 29 9-23-19     2. Patient will report at least 75% improvement with overall pain and cramping/spasm to improve her QOL. Status @ Eval: 4-8/10 at worst  Current: 8/10 occasionaly 9-23-19     3. Patient will be able to perform plank for at least 20 seconds each directions to improve core strength for prolonged standing/amb during working. Status @ Eval: will initiate next visit   Current: not met with full plank; mod shaking with side plank on Left due to Right core weakness?  9-23-19     5. Patient will be independent with HEP at time of discharge to be able to continue with pelvic floor program.  Status @ Eval: initiated with stretching and relaxation techniques   Current: good compliance per pt report 9-23-19     PLAN  [x]  Upgrade activities as tolerated     [x]  Continue plan of care  []  Update interventions per flow sheet       []  Discharge due to:_  []  Other:_       Joe Cummings, PT 9/23/2019  9:49 AM    Future Appointments   Date Time Provider Chepe Xie   9/23/2019 10:00 AM Rosales Anton [facility]   9/30/2019 10:00 AM Nara Roberts [id number]   10/7/2019 10:00 AM Nara Roberts [facility]   10/14/2019 10:45 AM Estella Roque [facility]

## 2019-09-30 ENCOUNTER — HOSPITAL ENCOUNTER (OUTPATIENT)
Dept: PHYSICAL THERAPY | Age: 29
Discharge: HOME | End: 2019-09-30
Payer: OTHER GOVERNMENT

## 2019-09-30 PROCEDURE — 97530 THERAPEUTIC ACTIVITIES: CPT

## 2019-09-30 PROCEDURE — 97110 THERAPEUTIC EXERCISES: CPT

## 2019-09-30 NOTE — PROGRESS NOTES
PF DAILY TREATMENT NOTE 3-16    Patient Name: Mica Mitchell  Date:2019  : 1990  [x]  Patient  Verified  Payor:  / Plan: Errol Pozo 74 / Product Type:  /    In time: 10:00  Out time:10:53  Total Treatment Time (min): 48  Visit #: 6 of     Treatment Area: [x] Pelvic Floor     [] Other:    SUBJECTIVE  Pain Level (0-10 scale): 510  Any medication changes, allergies to medications, adverse drug reactions, diagnosis change, or new procedure performed?: [x] No    [] Yes (see summary sheet for update)  Subjective functional status/changes:   [] No changes reported  Pt reports falling at school last Thursday due to wet floor; pt landed on her Right side thus having more pain right now. Pt reports only mild pain with last sexual relations, no problem with bowel or bladder.      OBJECTIVE    Modality rationale: decrease pain and increase tissue extensibility to improve the patients ability to tolerate ADLs/amb   Min Type Additional Details    [] Estim:  []Unatt       []IFC  []Premod                        []Other:  []w/ice   []w/heat  Position:  Location:    [] Estim: []Att    []TENS instruct  []NMES                    []Other:  []w/US   []w/ice   []w/heat  Position:  Location:    []  Ultrasound: []Continuous   [] Pulsed                           []1MHz   []3MHz Position:  Location:   10 []  Ice     [x]  heat  []  Ice massage  []  Laser   []  Anodyne Position: sitting  Location: back and Right hip   [] Skin assessment post-treatment:  []intact []redness- no adverse reaction    []redness  adverse reaction:          33 min Therapeutic Exercise:  [x] See flow sheet :   []  Pelvic floor strengthening                 []  Pelvic floor downtraining  []  Quality pelvic floor contractions       [x]  Relaxation techniques  []  Urge suppression exercises  []  Other:  Rationale: increase ROM, increase strength, improve coordination and increase proprioception  to improve the patients ability to improve tolerance for ADLs     10 min Therapeutic Activity:  []  See flow sheet :    []  Increase Tissue extensibility        []  Assess fiber intake    [x]  Assess voiding habits                      [x]  Assess bowel habits  [x]  Other: review self correction for pelvic alignment             Rationale: increase ROM, increase strength, improve coordination and increase proprioception  to improve the patients ability to improve leakage, improve tolerance for sexual relations. With   [] TE   [] TA   [] neuro  [] manual   [] other: Patient Education: [x] Review HEP    [] Progressed/Changed HEP based on:   [] positioning   [] body mechanics   [] transfers   [] heat/ice application    [] other:      Other Objective/Functional Measures:   []baseline resting tone:   []slow twitch mms []fast twitch mms   Review LAD for self correction   No pain with all therex    Pain Level (0-10 scale) post treatment: 4/10    ASSESSMENT/Changes in Function: pt making steady progress with improving pain overall until the fall on Thursday. Pt denies any dizziness, reports mod soreness only. Pt demonstrates good understanding of HEP for self correction of pelvic alignment. Will cont progress core strengthening and PF relaxation techniques as tolerated.      []  Decrease # of leaks    [] No change []  Improving [] Resolved      []  Decrease hypertonus [] No change []  Improving [] Resolved      []  Increase void interval [] No change []  Improving [] Resolved      []  Increase PF strength [] No change []  Improving [] Resolved      []  Increase PF endurance [] No change []  Improving [] Resolved      []  Increase endurance [] No change []  Improving [] Resolved      []  Decrease # of pads [] No change []  Improving [] Resolved      []  Decrease pain [] No change []  Improving [] Resolved      []  Increased coordination [] No change []  Improving [] Resolved      []  Increased Bowel Frequency [] No change []  Improving [] Resolved         Patient will continue to benefit from skilled PT services to modify and progress therapeutic interventions, address functional mobility deficits, address ROM deficits, address strength deficits, analyze and address soft tissue restrictions, analyze and cue movement patterns, analyze and modify body mechanics/ergonomics, assess and modify postural abnormalities and instruct in home and community integration to attain remaining goals.     [x]  See Plan of Care  []  See progress note/recertification  []  See Discharge Summary    Progress towards goals / Updated goals:  Updated Goals: to be achieved in 4 weeks:  1. Patient will have FOTO score for PFDI Pain decreased by 5-8% points indicating improvement in function and report of no difficulty with maintaining intimate relations due to pain. Status @ Eval: 21  Current: regressed significantly 29 9-23-19     2. Patient will report at least 75% improvement with overall pain and cramping/spasm to improve her QOL. Status @ Eval: 4-8/10 at worst  Current: 8/10 occasionaly 9-23-19; mild pain with sexual relations, increased pain overall after fall 9-30-19     3. Patient will be able to perform plank for at least 25 seconds each directions to improve core strength for prolonged standing/amb during working.   Status @ Eval: will initiate next visit   Current: not met with full plank; mod shaking with side plank on Left due to Right core weakness? 9-23-19     5. Patient will be independent with HEP at time of discharge to be able to continue with pelvic floor program.  Status @ Eval: initiated with stretching and relaxation techniques   Current: good compliance per pt report 9-23-19     PLAN  [x]  Upgrade activities as tolerated     [x]  Continue plan of care  []  Update interventions per flow sheet       []  Discharge due to:_  []  Other:_       Ryland Moran, PT 9/30/2019  9:52 AM    Future Appointments   Date Time Provider Chepe Xie   9/30/2019 10:00 AM Martin Man [facility]   10/7/2019 10:00 AM Martin Man [facility]   10/14/2019 10:45 AM Connie Sanchez [facility]

## 2019-10-07 ENCOUNTER — HOSPITAL ENCOUNTER (OUTPATIENT)
Dept: PHYSICAL THERAPY | Age: 29
Discharge: HOME | End: 2019-10-07
Payer: OTHER GOVERNMENT

## 2019-10-07 PROCEDURE — 97140 MANUAL THERAPY 1/> REGIONS: CPT

## 2019-10-07 PROCEDURE — 97110 THERAPEUTIC EXERCISES: CPT

## 2019-10-07 NOTE — PROGRESS NOTES
PF DAILY TREATMENT NOTE 3-16    Patient Name: Victorino Ryan  Date:10/7/2019  : 1990  [x]  Patient  Verified  Payor:  / Plan: [facility]  [facility] / Product Type:  /    In time: 10:01  Out time: 10:50  Total Treatment Time (min): 49  Visit #: 7 of     Treatment Area: [x] Pelvic Floor     [] Other:    SUBJECTIVE  Pain Level (0-10 scale): 6/10  Any medication changes, allergies to medications, adverse drug reactions, diagnosis change, or new procedure performed?: [x] No    [] Yes (see summary sheet for update)  Subjective functional status/changes:   [] No changes reported  Pt reports gradual improvement with abdominal pain. She's having a bad day today though    She was able to perform correction on her own to improve her alignment. OBJECTIVE         39 min Therapeutic Exercise:  [x] See flow sheet :   []  Pelvic floor strengthening                 []  [facility] floor downtraining  []  Quality pelvic floor contractions       [x]  Relaxation techniques  []  Urge suppression exercises  []  Other:  Rationale: increase ROM, increase strength, improve coordination and increase proprioception  to improve the patients ability to improve tolerance for ADLs   sexual relations.     10 min Manual Therapy:  Abdominal MFR   Rationale: decrease pain, increase ROM, increase tissue extensibility and decrease trigger points to improve tolerance for ADLs/job duties     min Bladder Training :   [] voiding schedule          [] program progression  [] decrease every  minutes/hours           With   [] TE   [] TA   [] neuro  [] manual   [] other: Patient Education: [x] Review HEP    [] Progressed/Changed HEP based on:   [] positioning   [] body mechanics   [] transfers   [] heat/ice application    [] other:      Other Objective/Functional Measures:   []baseline resting tone:   []slow twitch mms   []fast twitch mm    Pain Level (0-10 scale) post treatment: 6/10    ASSESSMENT/Changes in Function: pt's pain cont to stay mod-high after the fall. She demonstrates good compliance with HEP and self correction for pelvic alignment. No pain/problem reported with therex today. Educated pt to contact her insurance after 1 more week if no one call her to update on TENs unit. Will cont with manual next visit for abdominal and internal MFR. []  Decrease # of leaks    [] No change []  Improving [] Resolved      []  Decrease hypertonus [] No change []  Improving [] Resolved      []  Increase void interval [] No change []  Improving [] Resolved      []  Increase PF strength [] No change []  Improving [] Resolved      []  Increase PF endurance [] No change []  Improving [] Resolved      []  Increase endurance [] No change []  Improving [] Resolved      []  Decrease # of pads [] No change []  Improving [] Resolved      []  Decrease pain [] No change []  Improving [] Resolved      []  Increased coordination [] No change []  Improving [] Resolved      []  Increased Bowel Frequency [] No change []  Improving [] Resolved         Patient will continue to benefit from skilled PT services to modify and progress therapeutic interventions, address functional mobility deficits, address ROM deficits, address strength deficits, analyze and address soft tissue restrictions, analyze and cue movement patterns, analyze and modify body mechanics/ergonomics, assess and modify postural abnormalities and instruct in home and community integration to attain remaining goals.     [x]  See Plan of Care  []  See progress note/recertification  []  See Discharge Summary     Progress towards goals / Updated goals:  Updated Goals: to be achieved in 4 weeks:  1. Patient will have FOTO score for PFDI Pain decreased by 5-8% points indicating improvement in function and report of no difficulty with maintaining intimate relations due to pain. Status @ Eval: 21  Current: regressed significantly 29 9-23-19     2.  Patient will report at least 75% improvement with overall pain and cramping/spasm to improve her QOL. Status @ Eval: 4-8/10 at worst  Current: 8/10 occasionaly 9-23-19; mild pain with sexual relations, increased pain overall after fall 9-30-19; cont to fluctuate after fall 10-7-19     3. Patient will be able to perform plank for at least 25 seconds each directions to improve core strength for prolonged standing/amb during working.   Status @ Eval: will initiate next visit   Current: not met with full plank; mod shaking with side plank on Left due to Right core weakness? 9-23-19     5. Patient will be independent with HEP at time of discharge to be able to continue with pelvic floor program.  Status @ Eval: initiated with stretching and relaxation techniques   Current: good compliance per pt report 9-23-19     PLAN  [x]  Upgrade activities as tolerated     [x]  Continue plan of care  []  Update interventions per flow sheet       []  Discharge due to:_  []  Other:_      Montrell Mancera, PT 10/7/2019  9:51 AM    Future Appointments   Date Time Provider Chepe Xie   10/7/2019 10:00 AM Lazaro Mark [facility]   10/14/2019 10:45 AM Connie Roper [facility]

## 2019-10-14 ENCOUNTER — HOSPITAL ENCOUNTER (OUTPATIENT)
Dept: PHYSICAL THERAPY | Age: 29
Discharge: HOME | End: 2019-10-14
Payer: OTHER GOVERNMENT

## 2019-10-14 PROCEDURE — 97140 MANUAL THERAPY 1/> REGIONS: CPT

## 2019-10-14 PROCEDURE — 97110 THERAPEUTIC EXERCISES: CPT

## 2019-10-14 NOTE — PROGRESS NOTES
PF DAILY TREATMENT NOTE 3-16    Patient Name: Johny Castellanos  Date:10/14/2019  : 1990  [x]  Patient  Verified  Payor:  / Plan: [facility]  [facility] / Product Type:  /    In time: 10:48  Out time: 11:25  Total Treatment Time (min): 37  Visit #: 8 of     Treatment Area: [x] Pelvic Floor     [] Other:    SUBJECTIVE  Pain Level (0-10 scale): 4/10  Any medication changes, allergies to medications, adverse drug reactions, diagnosis change, or new procedure performed?: [x] No    [] Yes (see summary sheet for update)  Subjective functional status/changes:   [] No changes reported  Pt reports more soreness than pain. Pt will contact her insurance considering TENs unit     OBJECTIVE       29 min Therapeutic Exercise:  [x] See flow sheet :   []  Pelvic floor strengthening                 []  QJUXHP floor downtraining  []  Quality pelvic floor contractions       [x]  Relaxation techniques  []  Urge suppression exercises  []  Other:  Rationale: increase ROM, increase strength, improve coordination and increase proprioception  to improve the patients ability to improve tolerance for ADLs   sexual relations.     8 min Manual Therapy:  Abdominal MFR   Rationale: decrease pain, increase ROM, increase tissue extensibility and decrease trigger points to improve tolerance for ADLs/job duties          With   [] TE   [] TA   [] neuro  [] manual   [] other: Patient Education: [x] Review HEP    [] Progressed/Changed HEP based on:   [] positioning   [] body mechanics   [] transfers   [] heat/ice application    [] other:      Other Objective/Functional Measures:   []baseline resting tone:   []slow twitch mms   []fast twitch mms    Pain Level (0-10 scale) post treatment: 3/10    ASSESSMENT/Changes in Function: Pt making steady progress with improving pain overall. She still demonstrates poor strength of hips flexors, especially with ecc control of Right side.  Deferred internal manual today due to menses and pt's request. Will cont with PF down training and internal manual next visit. []  Decrease # of leaks    [] No change []  Improving [] Resolved      []  Decrease hypertonus [] No change []  Improving [] Resolved      []  Increase void interval [] No change []  Improving [] Resolved      []  Increase PF strength [] No change []  Improving [] Resolved      []  Increase PF endurance [] No change []  Improving [] Resolved      []  Increase endurance [] No change []  Improving [] Resolved      []  Decrease # of pads [] No change []  Improving [] Resolved      []  Decrease pain [] No change []  Improving [] Resolved      []  Increased coordination [] No change []  Improving [] Resolved      []  Increased Bowel Frequency [] No change []  Improving [] Resolved         Patient will continue to benefit from skilled PT services to modify and progress therapeutic interventions, address functional mobility deficits, address ROM deficits, address strength deficits, analyze and address soft tissue restrictions, analyze and cue movement patterns, analyze and modify body mechanics/ergonomics, assess and modify postural abnormalities and instruct in home and community integration to attain remaining goals.     [x]  See Plan of Care  []  See progress note/recertification  []  See Discharge Summary     Progress towards goals / Updated goals:  Updated Goals: to be achieved in 4 weeks:  1. Patient will have FOTO score for PFDI Pain decreased by 5-8% points indicating improvement in function and report of no difficulty with maintaining intimate relations due to pain. Status @ Eval: 21  Current: regressed significantly 29 9-23-19     2. Patient will report at least 75% improvement with overall pain and cramping/spasm to improve her QOL.   Status @ Eval: 4-8/10 at worst  Current: 8/10 occasionaly 9-23-19; mild pain with sexual relations, increased pain overall after fall 9-30-19; cont to fluctuate after fall 10-7-19; progressing slowly 10-14-19     3. Patient will be able to perform plank for at least 25 seconds each directions to improve core strength for prolonged standing/amb during working.   Status @ Eval: will initiate next visit   Current: not met with full plank; mod shaking with side plank on Left due to Right core weakness? 9-23-19     5. Patient will be independent with HEP at time of discharge to be able to continue with pelvic floor program.  Status @ Eval: initiated with stretching and relaxation techniques   Current: good compliance per pt report 9-23-19     PLAN  [x]  Upgrade activities as tolerated     [x]  Continue plan of care  []  Update interventions per flow sheet       []  Discharge due to:_  []  Other:_      Ryland Moran, Pt 10/14/2019  9:49 AM    Future Appointments   Date Time Provider Chepe Xie   10/14/2019 10:45 AM [facility]

## 2019-10-21 ENCOUNTER — HOSPITAL ENCOUNTER (OUTPATIENT)
Dept: PHYSICAL THERAPY | Age: 29
Discharge: HOME | End: 2019-10-21
Payer: OTHER GOVERNMENT

## 2019-10-21 ENCOUNTER — APPOINTMENT (OUTPATIENT)
Dept: PHYSICAL THERAPY | Age: 29
End: 2019-10-21
Payer: OTHER GOVERNMENT

## 2019-10-21 PROCEDURE — 97110 THERAPEUTIC EXERCISES: CPT

## 2019-10-21 PROCEDURE — 97530 THERAPEUTIC ACTIVITIES: CPT

## 2019-10-21 NOTE — PROGRESS NOTES
PF DAILY TREATMENT NOTE 3-16    Patient Name: Jett Banks  Date:10/21/2019  : 1990  [x]  Patient  Verified  Payor:  / Plan: [facility]  [facility] REGION / Product Type:  /    In time: 10:57  Out time: 11:33  Total Treatment Time (min): 36  Visit #: 9 of     Treatment Area: [x] Pelvic Floor     [] Other:    SUBJECTIVE  Pain Level (0-10 scale): 1-2/10  Any medication changes, allergies to medications, adverse drug reactions, diagnosis change, or new procedure performed?: [x] No    [] Yes (see summary sheet for update)  Subjective functional status/changes:   [] No changes reported  Pt reports about 50% improvement overall. She doesn't have as much cramping and pain along her abdominal region.      She has a little soreness today due to physical testing on Friday and prolonged walking (about 5 miles) for her cooking contest during the weekend    OBJECTIVE     28 min Therapeutic Exercise:  [x] See flow sheet :   []  Pelvic floor strengthening                 []  Pelvic floor downtraining  [x]  Quality pelvic floor contractions       [x]  Relaxation techniques  []  Urge suppression exercises  []  Other:  Rationale: increase ROM, increase strength, improve coordination and increase proprioception  to improve the patients ability to improve tolerance for ADLs   sexual relations.     8 min Therapeutic Activity:  []  See flow sheet :    []  Increase Tissue extensibility        []  Assess fiber intake    [x]  Assess voiding habits  [x]  Assess bowel habits  [x]  Other: progression of HEP   Rationale: increase ROM, increase strength, improve coordination and increase proprioception  to improve the patients ability to tolerate ADLs/sexual relations            With   [] TE   [] TA   [] neuro  [] manual   [] other: Patient Education: [x] Review HEP    [] Progressed/Changed HEP based on:   [] positioning   [] body mechanics   [] transfers   [] heat/ice application    [] other:      Other Objective/Functional Measures:   []baseline resting tone:   []slow twitch mms []fast twitch mms    Pain Level (0-10 scale) post treatment:     ASSESSMENT/Changes in Function: See progress note/recertification    []  Decrease # of leaks    [] No change []  Improving [] Resolved      []  Decrease hypertonus [] No change []  Improving [] Resolved      []  Increase void interval [] No change []  Improving [] Resolved      []  Increase PF strength [] No change []  Improving [] Resolved      []  Increase PF endurance [] No change []  Improving [] Resolved      []  Increase endurance [] No change []  Improving [] Resolved      []  Decrease # of pads [] No change []  Improving [] Resolved      []  Decrease pain [] No change []  Improving [] Resolved      []  Increased coordination [] No change []  Improving [] Resolved      []  Increased Bowel Frequency [] No change []  Improving [] Resolved         Patient will continue to benefit from skilled PT services to modify and progress therapeutic interventions, address functional mobility deficits, address ROM deficits, address strength deficits, analyze and address soft tissue restrictions, analyze and cue movement patterns, analyze and modify body mechanics/ergonomics, assess and modify postural abnormalities and instruct in home and community integration to attain remaining goals.     []  See Plan of Care  [x]  See progress note/recertification  []  See Discharge Summary     Progress towards goals / Updated goals:  Updated Goals: to be achieved in 4 weeks:  1. Patient will have FOTO score for PFDI Pain decreased by 5-8% points indicating improvement in function and report of no difficulty with maintaining intimate relations due to pain. Status @ Eval: 21  Current: regressed significantly 29 9-23-19, met 4 with PFDI Pain 10-21-19     2. Patient will report at least 75% improvement with overall pain and cramping/spasm to improve her QOL.   Status @ Eval: 4-8/10 at worst  Current: 8/10 occasionaly 9-23-19; mild pain with sexual relations, increased pain overall after fall 9-30-19; cont to fluctuate after fall 10-7-19; progressing slowly 10-14-19; 50% improvement overall 10-21-19     3. Patient will be able to perform plank for at least 25 seconds each directions to improve core strength for prolonged standing/amb during working.   Status @ Eval: will initiate next visit   Current: not met with full plank; mod shaking with side plank on Left due to Right core weakness? 9-23-19, met, min shaking for 30 seconds 10-21-19     5. Patient will be independent with HEP at time of discharge to be able to continue with pelvic floor program.  Status @ Eval: initiated with stretching and relaxation techniques   Current: good compliance per pt report 9-23-19, good compliance 10-21-19     PLAN  [x]  Upgrade activities as tolerated     [x]  Continue plan of care  []  Update interventions per flow sheet       []  Discharge due to:_  []  Other:_       Renu Marin, PT 10/21/2019  9:50 AM    Future Appointments   Date Time Provider Chepe Xie   10/21/2019 10:45 AM Gil Reyes [facility]

## 2019-10-21 NOTE — PROGRESS NOTES
[facility]  [facility] [employer] [facility]  [address] [place] [address]  [phone] fax    Physician Update  [x] Progress Note  [x] Discharge Summary  Patient name: Tina Mitchell Start of Care: 2019   Referral source: [facility], [address] [place] [address] [N94.2]  Payor:  / Plan: [employer] / Product Type: Enrike Amanda Agustin Patsaadia Onset Date: aggravated about 2-3 months               Treatment Diagnosis: PFD/abdominal pain   Prior Hospitalization: see medical history Provider#: [id number]   Medications: Verified on Patient summary List    Comorbidities: , gallbladder removal, fallopian tubes removed   Prior Level of Function: less pain, ind with all mobility, cook in [address]    Visits from Start of Care: 9    Missed Visits: 1    Status at Evaluation/Last Progress Note: Pt cont to experience pain with exercises, pain with sexual relations, and min leakage with sneezing, Pt present with decreased strength, endurance of core, deep hips and PF muscles contributing to pelvic mal-alignment    Progress towards Goals:   Updated Goals: to be achieved in 4 weeks:  1. Patient will have FOTO score for PFDI Pain decreased by 5-8% points indicating improvement in function and report of no difficulty with maintaining intimate relations due to pain. Status @ Eval: 21  Current: regressed significantly 29 19, met 4 with PFDI Pain 10-21-19    2. Patient will report at least 75% improvement with overall pain and cramping/spasm to improve her QOL. Status @ Eval: 4-8/10 at worst  Current: 8/10 occasionaly 19; mild pain with sexual relations, increased pain overall after fall 19; cont to fluctuate after fall 10-7-19; progressing slowly 10-14-19; 50% improvement overall 10-21-19    3.  Patient will be able to perform plank for at least 25 seconds each directions to improve core strength for prolonged standing/amb during working. Status @ Eval: will initiate next visit   Current: not met with full plank; mod shaking with side plank on Left due to Right core weakness? 9-23-19    4. Patient will be independent with HEP at time of discharge to be able to continue with pelvic floor program.  Status @ Eval: initiated with stretching and relaxation techniques   Current: good compliance per pt report 9-23-19, good compliance 10-21-19    Goals: to be achieved in 3 weeks:  1. Patient will report at least 75% improvement with overall pain and cramping/spasm to improve her QOL. Status @ Eval: 4-8/10 at worst  Current: 8/10 occasionaly 9-23-19; mild pain with sexual relations, increased pain overall after fall 9-30-19; cont to fluctuate after fall 10-7-19; progressing slowly 10-14-19; 50% improvement overall 10-21-19    2. Patient will be independent with HEP at time of discharge to be able to continue with pelvic floor program.  Status @ Eval: initiated with stretching and relaxation techniques   Current: good compliance per pt report 9-23-19, good compliance 10-21-19    ASSESSMENT/RECOMMENDATIONS: Pt making steady progress with 50% improvement overall. She reports improved pain and cramping of abdominal region. Pt notices good relaxation after HEP/stretching. She cont to be min challenged with core strength evident with shaking and fair form during side plank. Pt demonstrates good understanding of progression for PF, hips and core. Will cont with PF PT for 2-3 more weeks to address remained limitation and promote self management, pt pleased with updated POC.      [x]Continue therapy per initial plan/protocol at a frequency of  1 x per week for 3 weeks  []Continue therapy with the following recommended changes:_____________________      _____________________________________________________________________  []Discontinue therapy progressing towards or have reached established goals  []Discontinue therapy due to lack of appreciable progress towards goals  []Discontinue therapy due to lack of attendance or compliance  []Await Physician's recommendations/decisions regarding therapy  []Other:________________________________________________________________    Thank you for this referral.   Dallas Garcia, PT 10/21/2019 11:25 AM  NOTE TO PHYSICIAN:  [address] AND   FAX TO [facility] [place] [facility]: [phone]  If you are unable to process this request in 24 hours please contact our office: [phone] I have read the above report and request that my patient continue as recommended. ? I have read the above report and request that my patient continue therapy with the following changes/special instructions:____________________________________  ? I have read the above report and request that my patient be discharged from therapy.     [de-identified] Signature:____________Date:_________TIME:________    [employer], Date and Time must be completed for valid certification **

## 2019-10-28 ENCOUNTER — APPOINTMENT (OUTPATIENT)
Dept: PHYSICAL THERAPY | Age: 29
End: 2019-10-28
Payer: OTHER GOVERNMENT

## 2019-11-08 ENCOUNTER — APPOINTMENT (OUTPATIENT)
Dept: PHYSICAL THERAPY | Age: 29
End: 2019-11-08
Payer: OTHER GOVERNMENT

## 2019-11-11 ENCOUNTER — APPOINTMENT (OUTPATIENT)
Dept: PHYSICAL THERAPY | Age: 29
End: 2019-11-11
Payer: OTHER GOVERNMENT

## 2019-11-13 ENCOUNTER — APPOINTMENT (OUTPATIENT)
Dept: PHYSICAL THERAPY | Age: 29
End: 2019-11-13
Payer: OTHER GOVERNMENT

## 2019-11-20 ENCOUNTER — HOSPITAL ENCOUNTER (OUTPATIENT)
Dept: PHYSICAL THERAPY | Age: 29
Discharge: HOME | End: 2019-11-20
Payer: OTHER GOVERNMENT

## 2019-11-20 PROCEDURE — 97110 THERAPEUTIC EXERCISES: CPT

## 2019-11-20 PROCEDURE — 97530 THERAPEUTIC ACTIVITIES: CPT

## 2019-11-20 NOTE — PROGRESS NOTES
PF DAILY TREATMENT NOTE 3-16    Patient Name: Fiona Jo  Date:2019  : 1990  [x]  Patient  Verified  Payor:  / Plan: [facility]  [facility] REGION / Product Type:  /    In time: 11:36  Out time: 12:08  Total Treatment Time (min): 32  Visit #: 10 of     Treatment Area: [x] Pelvic Floor     [] Other:    SUBJECTIVE  Pain Level (0-10 scale): 0/10  Any medication changes, allergies to medications, adverse drug reactions, diagnosis change, or new procedure performed?: [x] No    [] Yes (see summary sheet for update)  Subjective functional status/changes:   [] No changes reported  Pt reports doing very well with PF, no more pain, no urgency and no problem with sexual relations. She missed 1 month due to other health issue. Pt is confident with cont HEP to maintain PF function.     OBJECTIVE      24 min Therapeutic Exercise:  [x] See flow sheet :   []  Pelvic floor strengthening                 []  Pelvic floor downtraining  []  Quality pelvic floor contractions       []  Relaxation techniques  []  Urge suppression exercises  [x]  Other: HEP review  Rationale: increase ROM, increase strength, improve coordination and increase proprioception  to improve the patients ability to maintain pain free functional mobility       8 min Therapeutic Activity:  []  See flow sheet :    []  Increase Tissue extensibility        []  Assess fiber intake    [x]  Assess voiding habits  [x]  Assess bowel habits  []  Other:   Rationale: increase ROM, increase strength, improve coordination and increase proprioception  to improve the patients ability to maintain pain free functional mobility          With   [] TE   [] TA   [] neuro  [] manual   [] other: Patient Education: [x] Review HEP    [] Progressed/Changed HEP based on:   [] positioning   [] body mechanics   [] transfers   [] heat/ice application    [] other:      Other Objective/Functional Measures:   []baseline resting tone:   []slow twitch mms   []fast twitch mms   Good form and no pain with all therex    Pain Level (0-10 scale) post treatment: 0/10    ASSESSMENT/Changes in Function: pt had 1 month gap with PT due to other health problem. She reports no problem with PF and good compliance with all therex. She still struggles with stress management but confident with self management to maintain PF function for pain free functional mobility/sexual relations. Pt is independent with updated HEP, discharged at this time as planned. []  Decrease # of leaks   [] No change []  Improving [] Resolved     []  Decrease hypertonus [] No change []  Improving [] Resolved     []  Increase void interval [] No change []  Improving [] Resolved     []  Increase PF strength [] No change []  Improving [] Resolved     []  Increase PF endurance [] No change []  Improving [] Resolved     []  Increase endurance [] No change []  Improving [] Resolved     []  Decrease # of pads [] No change []  Improving [] Resolved     []  Decrease pain [] No change []  Improving [] Resolved     []  Increased coordination [] No change []  Improving [] Resolved     []  Increased Bowel Frequency [] No change []  Improving [] Resolved         []  See Plan of Care  [x]  See progress note/recertification  [x]  See Discharge Summary         Progress towards goals / Updated goals:     Goals: to be achieved in 3 weeks:  1. Patient will report at least 75% improvement with overall pain and cramping/spasm to improve her QOL. Status @ Eval: 4-8/10 at worst  Current: 8/10 occasionaly 9-23-19; mild pain with sexual relations, increased pain overall after fall 9-30-19; cont to fluctuate after fall 10-7-19; progressing slowly 10-14-19; 50% improvement overall 10-21-19; met 11-20-19     2.  Patient will be independent with HEP at time of discharge to be able to continue with pelvic floor program.  Status @ Eval: initiated with stretching and relaxation techniques   Current: good compliance per pt report 9-23-19, good compliance 10-21-19; met 11-20-19    PLAN  []  Upgrade activities as tolerated     []  Continue plan of care  []  Update interventions per flow sheet       [x]  Discharge due to:_ met all established goals  []  Other:_      Rosanna Avila, PT 11/20/2019  9:52 AM    Future Appointments   Date Time Provider Chepe Xie   11/20/2019 11:30 AM Maisha Dias [facility]

## 2019-12-09 ENCOUNTER — HOSPITAL ENCOUNTER (INPATIENT)
Age: 29
LOS: 2 days | Discharge: HOME | DRG: 872 | End: 2019-12-11
Attending: EMERGENCY MEDICINE | Admitting: HOSPITALIST
Payer: OTHER GOVERNMENT

## 2019-12-09 ENCOUNTER — APPOINTMENT (OUTPATIENT)
Dept: ULTRASOUND IMAGING | Age: 29
DRG: 872 | End: 2019-12-09
Attending: EMERGENCY MEDICINE
Payer: OTHER GOVERNMENT

## 2019-12-09 ENCOUNTER — APPOINTMENT (OUTPATIENT)
Dept: CT IMAGING | Age: 29
DRG: 872 | End: 2019-12-09
Attending: EMERGENCY MEDICINE
Payer: OTHER GOVERNMENT

## 2019-12-09 ENCOUNTER — APPOINTMENT (OUTPATIENT)
Dept: GENERAL RADIOLOGY | Age: 29
DRG: 872 | End: 2019-12-09
Attending: EMERGENCY MEDICINE
Payer: OTHER GOVERNMENT

## 2019-12-09 DIAGNOSIS — N12 PYELONEPHRITIS: ICD-10-CM

## 2019-12-09 DIAGNOSIS — R00.0 TACHYCARDIA: ICD-10-CM

## 2019-12-09 DIAGNOSIS — R10.31 RLQ ABDOMINAL PAIN: Primary | ICD-10-CM

## 2019-12-09 PROBLEM — A41.9 SEPSIS (HCC): Status: ACTIVE | Noted: 2019-12-09

## 2019-12-09 LAB
ALBUMIN SERPL-MCNC: 4.4 G/DL (ref 3.4–5)
ALBUMIN/GLOB SERPL: 1.3 {RATIO} (ref 0.8–1.7)
ALP SERPL-CCNC: 72 U/L (ref 45–117)
ALT SERPL-CCNC: 70 U/L (ref 13–56)
ANION GAP SERPL CALC-SCNC: 6 MMOL/L (ref 3–18)
APPEARANCE UR: CLEAR
AST SERPL-CCNC: 28 U/L (ref 10–38)
BACTERIA URNS QL MICRO: ABNORMAL /HPF
BASOPHILS # BLD: 0 K/UL (ref 0–0.1)
BASOPHILS NFR BLD: 0 % (ref 0–2)
BILIRUB SERPL-MCNC: 0.9 MG/DL (ref 0.2–1)
BILIRUB UR QL: NEGATIVE
BUN SERPL-MCNC: 7 MG/DL (ref 7–18)
BUN/CREAT SERPL: 8 (ref 12–20)
CALCIUM SERPL-MCNC: 8.8 MG/DL (ref 8.5–10.1)
CHLORIDE SERPL-SCNC: 105 MMOL/L (ref 100–111)
CO2 SERPL-SCNC: 27 MMOL/L (ref 21–32)
COLOR UR: YELLOW
CREAT SERPL-MCNC: 0.87 MG/DL (ref 0.6–1.3)
DIFFERENTIAL METHOD BLD: ABNORMAL
EOSINOPHIL # BLD: 0 K/UL (ref 0–0.4)
EOSINOPHIL NFR BLD: 0 % (ref 0–5)
EPITH CASTS URNS QL MICRO: ABNORMAL /LPF (ref 0–5)
ERYTHROCYTE [DISTWIDTH] IN BLOOD BY AUTOMATED COUNT: 14 % (ref 11.6–14.5)
GLOBULIN SER CALC-MCNC: 3.4 G/DL (ref 2–4)
GLUCOSE SERPL-MCNC: 102 MG/DL (ref 74–99)
GLUCOSE UR STRIP.AUTO-MCNC: NEGATIVE MG/DL
HCG UR QL: NEGATIVE
HCT VFR BLD AUTO: 41.8 % (ref 35–45)
HGB BLD-MCNC: 13.7 G/DL (ref 12–16)
HGB UR QL STRIP: ABNORMAL
KETONES UR QL STRIP.AUTO: ABNORMAL MG/DL
LACTATE BLD-SCNC: 0.93 MMOL/L (ref 0.4–2)
LEUKOCYTE ESTERASE UR QL STRIP.AUTO: ABNORMAL
LIPASE SERPL-CCNC: 136 U/L (ref 73–393)
LYMPHOCYTES # BLD: 1.1 K/UL (ref 0.9–3.6)
LYMPHOCYTES NFR BLD: 7 % (ref 21–52)
MAGNESIUM SERPL-MCNC: 1.9 MG/DL (ref 1.6–2.6)
MCH RBC QN AUTO: 29.6 PG (ref 24–34)
MCHC RBC AUTO-ENTMCNC: 32.8 G/DL (ref 31–37)
MCV RBC AUTO: 90.3 FL (ref 74–97)
MONOCYTES # BLD: 0.3 K/UL (ref 0.05–1.2)
MONOCYTES NFR BLD: 2 % (ref 3–10)
MUCOUS THREADS URNS QL MICRO: ABNORMAL /LPF
NEUTS SEG # BLD: 14.7 K/UL (ref 1.8–8)
NEUTS SEG NFR BLD: 91 % (ref 40–73)
NITRITE UR QL STRIP.AUTO: NEGATIVE
PH UR STRIP: 6 [PH] (ref 5–8)
PLATELET # BLD AUTO: 179 K/UL (ref 135–420)
PMV BLD AUTO: 10.1 FL (ref 9.2–11.8)
POTASSIUM SERPL-SCNC: 3.4 MMOL/L (ref 3.5–5.5)
PROT SERPL-MCNC: 7.8 G/DL (ref 6.4–8.2)
PROT UR STRIP-MCNC: ABNORMAL MG/DL
RBC # BLD AUTO: 4.63 M/UL (ref 4.2–5.3)
RBC #/AREA URNS HPF: ABNORMAL /HPF (ref 0–5)
SODIUM SERPL-SCNC: 138 MMOL/L (ref 136–145)
SP GR UR REFRACTOMETRY: 1.02 (ref 1–1.03)
UROBILINOGEN UR QL STRIP.AUTO: 1 EU/DL (ref 0.2–1)
WBC # BLD AUTO: 16 K/UL (ref 4.6–13.2)
WBC URNS QL MICRO: ABNORMAL /HPF (ref 0–4)

## 2019-12-09 PROCEDURE — 65270000029 HC RM PRIVATE

## 2019-12-09 PROCEDURE — 76830 TRANSVAGINAL US NON-OB: CPT

## 2019-12-09 PROCEDURE — 87077 CULTURE AEROBIC IDENTIFY: CPT

## 2019-12-09 PROCEDURE — 74011000258 HC RX REV CODE- 258: Performed by: HOSPITALIST

## 2019-12-09 PROCEDURE — 96376 TX/PRO/DX INJ SAME DRUG ADON: CPT

## 2019-12-09 PROCEDURE — 74011636320 HC RX REV CODE- 636/320: Performed by: EMERGENCY MEDICINE

## 2019-12-09 PROCEDURE — 81001 URINALYSIS AUTO W/SCOPE: CPT

## 2019-12-09 PROCEDURE — 93005 ELECTROCARDIOGRAM TRACING: CPT

## 2019-12-09 PROCEDURE — 74011250637 HC RX REV CODE- 250/637: Performed by: HOSPITALIST

## 2019-12-09 PROCEDURE — 85025 COMPLETE CBC W/AUTO DIFF WBC: CPT

## 2019-12-09 PROCEDURE — 74177 CT ABD & PELVIS W/CONTRAST: CPT

## 2019-12-09 PROCEDURE — 99284 EMERGENCY DEPT VISIT MOD MDM: CPT

## 2019-12-09 PROCEDURE — 83735 ASSAY OF MAGNESIUM: CPT

## 2019-12-09 PROCEDURE — 74011250636 HC RX REV CODE- 250/636: Performed by: HOSPITALIST

## 2019-12-09 PROCEDURE — 74011250636 HC RX REV CODE- 250/636: Performed by: EMERGENCY MEDICINE

## 2019-12-09 PROCEDURE — 81025 URINE PREGNANCY TEST: CPT

## 2019-12-09 PROCEDURE — 80053 COMPREHEN METABOLIC PANEL: CPT

## 2019-12-09 PROCEDURE — 71045 X-RAY EXAM CHEST 1 VIEW: CPT

## 2019-12-09 PROCEDURE — 96365 THER/PROPH/DIAG IV INF INIT: CPT

## 2019-12-09 PROCEDURE — 83605 ASSAY OF LACTIC ACID: CPT

## 2019-12-09 PROCEDURE — 87040 BLOOD CULTURE FOR BACTERIA: CPT

## 2019-12-09 PROCEDURE — 87086 URINE CULTURE/COLONY COUNT: CPT

## 2019-12-09 PROCEDURE — 83690 ASSAY OF LIPASE: CPT

## 2019-12-09 PROCEDURE — 74011000258 HC RX REV CODE- 258: Performed by: EMERGENCY MEDICINE

## 2019-12-09 PROCEDURE — 87186 SC STD MICRODIL/AGAR DIL: CPT

## 2019-12-09 PROCEDURE — 96375 TX/PRO/DX INJ NEW DRUG ADDON: CPT

## 2019-12-09 RX ORDER — NALOXONE HYDROCHLORIDE 0.4 MG/ML
0.4 INJECTION, SOLUTION INTRAMUSCULAR; INTRAVENOUS; SUBCUTANEOUS AS NEEDED
Status: DISCONTINUED | OUTPATIENT
Start: 2019-12-09 | End: 2019-12-11 | Stop reason: HOSPADM

## 2019-12-09 RX ORDER — ONDANSETRON 2 MG/ML
4 INJECTION INTRAMUSCULAR; INTRAVENOUS
Status: DISCONTINUED | OUTPATIENT
Start: 2019-12-09 | End: 2019-12-11 | Stop reason: HOSPADM

## 2019-12-09 RX ORDER — HYDROMORPHONE HYDROCHLORIDE 1 MG/ML
0.5 INJECTION, SOLUTION INTRAMUSCULAR; INTRAVENOUS; SUBCUTANEOUS
Status: DISCONTINUED | OUTPATIENT
Start: 2019-12-09 | End: 2019-12-11 | Stop reason: HOSPADM

## 2019-12-09 RX ORDER — POTASSIUM CHLORIDE 20 MEQ/1
40 TABLET, EXTENDED RELEASE ORAL
Status: COMPLETED | OUTPATIENT
Start: 2019-12-09 | End: 2019-12-09

## 2019-12-09 RX ORDER — DEXTROSE MONOHYDRATE AND SODIUM CHLORIDE 5; .9 G/100ML; G/100ML
100 INJECTION, SOLUTION INTRAVENOUS CONTINUOUS
Status: DISPENSED | OUTPATIENT
Start: 2019-12-09 | End: 2019-12-10

## 2019-12-09 RX ORDER — MORPHINE SULFATE 4 MG/ML
4 INJECTION, SOLUTION INTRAMUSCULAR; INTRAVENOUS
Status: COMPLETED | OUTPATIENT
Start: 2019-12-09 | End: 2019-12-09

## 2019-12-09 RX ORDER — ONDANSETRON 2 MG/ML
4 INJECTION INTRAMUSCULAR; INTRAVENOUS
Status: COMPLETED | OUTPATIENT
Start: 2019-12-09 | End: 2019-12-09

## 2019-12-09 RX ORDER — CEFTRIAXONE 1 G/1
1 INJECTION, POWDER, FOR SOLUTION INTRAMUSCULAR; INTRAVENOUS
Status: DISCONTINUED | OUTPATIENT
Start: 2019-12-09 | End: 2019-12-09 | Stop reason: RX

## 2019-12-09 RX ORDER — OXYCODONE AND ACETAMINOPHEN 5; 325 MG/1; MG/1
1 TABLET ORAL
Status: DISCONTINUED | OUTPATIENT
Start: 2019-12-09 | End: 2019-12-11 | Stop reason: HOSPADM

## 2019-12-09 RX ORDER — IBUPROFEN 200 MG
400 TABLET ORAL
Status: DISCONTINUED | OUTPATIENT
Start: 2019-12-09 | End: 2019-12-11 | Stop reason: HOSPADM

## 2019-12-09 RX ORDER — ACETAMINOPHEN 325 MG/1
650 TABLET ORAL
Status: DISCONTINUED | OUTPATIENT
Start: 2019-12-09 | End: 2019-12-11 | Stop reason: HOSPADM

## 2019-12-09 RX ORDER — SODIUM CHLORIDE 0.9 % (FLUSH) 0.9 %
5-10 SYRINGE (ML) INJECTION AS NEEDED
Status: DISCONTINUED | OUTPATIENT
Start: 2019-12-09 | End: 2019-12-11 | Stop reason: HOSPADM

## 2019-12-09 RX ADMIN — SODIUM CHLORIDE 1000 ML: 900 INJECTION, SOLUTION INTRAVENOUS at 09:00

## 2019-12-09 RX ADMIN — OXYCODONE HYDROCHLORIDE AND ACETAMINOPHEN 1 TABLET: 5; 325 TABLET ORAL at 18:36

## 2019-12-09 RX ADMIN — SODIUM CHLORIDE 1000 ML: 900 INJECTION, SOLUTION INTRAVENOUS at 11:52

## 2019-12-09 RX ADMIN — ACETAMINOPHEN 650 MG: 325 TABLET, FILM COATED ORAL at 17:01

## 2019-12-09 RX ADMIN — SODIUM CHLORIDE 701 ML: 900 INJECTION, SOLUTION INTRAVENOUS at 12:50

## 2019-12-09 RX ADMIN — IBUPROFEN 400 MG: 200 TABLET, FILM COATED ORAL at 20:59

## 2019-12-09 RX ADMIN — MORPHINE SULFATE 4 MG: 4 INJECTION, SOLUTION INTRAMUSCULAR; INTRAVENOUS at 12:49

## 2019-12-09 RX ADMIN — ONDANSETRON 4 MG: 2 INJECTION INTRAMUSCULAR; INTRAVENOUS at 16:50

## 2019-12-09 RX ADMIN — CEFTRIAXONE 1 G: 1 INJECTION, POWDER, FOR SOLUTION INTRAMUSCULAR; INTRAVENOUS at 11:52

## 2019-12-09 RX ADMIN — IOPAMIDOL 100 ML: 612 INJECTION, SOLUTION INTRAVENOUS at 10:19

## 2019-12-09 RX ADMIN — MORPHINE SULFATE 4 MG: 4 INJECTION, SOLUTION INTRAMUSCULAR; INTRAVENOUS at 08:55

## 2019-12-09 RX ADMIN — POTASSIUM CHLORIDE 40 MEQ: 1500 TABLET, EXTENDED RELEASE ORAL at 16:14

## 2019-12-09 RX ADMIN — ONDANSETRON 4 MG: 2 INJECTION INTRAMUSCULAR; INTRAVENOUS at 08:55

## 2019-12-09 RX ADMIN — DEXTROSE MONOHYDRATE AND SODIUM CHLORIDE 100 ML/HR: 5; .9 INJECTION, SOLUTION INTRAVENOUS at 16:50

## 2019-12-09 NOTE — PROGRESS NOTES
1610: TRANSFER - IN REPORT:    Verbal report received from RN on [place] 87  being received from Ed(unit) for routine progression of care      Report consisted of patients Situation, Background, Assessment and   Recommendations(SBAR). Information from the following report(s) SBAR, Kardex, ED Summary, Procedure Summary, Intake/Output and MAR was reviewed with the receiving nurse. Opportunity for questions and clarification was provided. Assessment completed upon patients arrival to unit and care assumed. 1632: Assessment and admission database completed. Patient's vitals taken. IVF started and Zofran for nausea and tylenol for pain and fever given. Spoke with MD about the patient HR and asked if telemetry was needed, he stated no. Patient restin in bed call light within reach and Bed locked and in lowest position. gripper socks applied ad educated patient to call for assistance. 1745: Rechecked Vitals, patient's heart rate and temp are elevated. CCL spoke with MD Motrin ordered to be alternated 2 hours after each administration. HCG was negative reviewed for motrin dosing. Ice packs placed under patient's arms. 1836: Pain medication given. 1925: Bedside shift change report given to [address] [place] [address]  (oncoming nurse) by [place] Cesar METCALF  (offgoing nurse). Report included the following information SBAR, Kardex, Procedure Summary, Intake/Output and MAR.

## 2019-12-09 NOTE — ED PROVIDER NOTES
And    Date: 2019  Patient Name: Delilah Mitchell    History of Presenting Illness     Chief Complaint   Patient presents with    Abdominal Pain     History Provided By: Patient    HPI/Chief Complaint: (Context):who presents with chief complaint of right lower quadrant pain constant 3 days waxing and waning pain started in the right flank. Patient states no history of kidney stone no hematuria  Patient has pelvic surgery including bilateral fallopian tube removal    periods otherwise have been normal  Pain is severe radiates from back to front  Worse with movement. No vaginal bleeding no vaginal discharge  There is nausea but no vomiting there is some loose bowel movements as well yesterday x4    Constant 3 days and some  Moderate symptoms  Worse with movement and palpation    ---  Patient's triage note is reviewed  Patient has no known allergies  Home medication include Zofran Carafate and Pepcid  Surgical history is cholecystectomy and  and fallopian tube removal  Patient is not a smoker no alcohol no drugs    PCP: None    Current Facility-Administered Medications   Medication Dose Route Frequency Provider Last Rate Last Dose    sodium chloride (NS) flush 5-10 mL  5-10 mL IntraVENous PRN Vladimir Vyas MD         Current Outpatient Medications   Medication Sig Dispense Refill    sucralfate (CARAFATE) 100 mg/mL suspension Take 10 mL by mouth four (4) times daily. 414 mL 0    famotidine (PEPCID) 20 mg tablet Take 1 Tab by mouth two (2) times a day. 20 Tab 0    ondansetron (ZOFRAN ODT) 4 mg disintegrating tablet Take 1 Tab by mouth every eight (8) hours as needed for Nausea. 15 Tab 0       Past History     Past Medical History:  History reviewed. No pertinent past medical history. Past Surgical History:  Past Surgical History:   Procedure Laterality Date    HX CHOLECYSTECTOMY      HX GYN      C Section and fallopian tube removal       Family History:  History reviewed.  No pertinent family history. Social History:  Social History     Tobacco Use    Smoking status: Never Smoker    Smokeless tobacco: Never Used   Substance Use Topics    Alcohol use: Not on file    Drug use: No       Allergies:  No Known Allergies      Review of Systems   Review of Systems   Constitutional: Negative for activity change, fatigue and fever. HENT: Negative for congestion and rhinorrhea. Eyes: Negative for visual disturbance. Respiratory: Negative for shortness of breath. Cardiovascular: Negative for chest pain and palpitations. Gastrointestinal: Positive for abdominal pain, diarrhea and nausea. Negative for vomiting. Genitourinary: Negative for decreased urine volume, dysuria, hematuria, menstrual problem, pelvic pain, urgency, vaginal bleeding, vaginal discharge and vaginal pain. Musculoskeletal: Negative for back pain. Skin: Negative for rash. Neurological: Negative for dizziness, weakness and light-headedness. Psychiatric/Behavioral: Negative for agitation. All other systems reviewed and are negative. Physical Exam     Physical Exam  Vitals signs and nursing note reviewed. Constitutional:       General: She is not in acute distress. Appearance: She is well-developed. HENT:      Head: Normocephalic and atraumatic. Right Ear: External ear normal.      Left Ear: External ear normal.      Nose: Nose normal.   Eyes:      General: No scleral icterus. Conjunctiva/sclera: Conjunctivae normal.      Pupils: Pupils are equal, round, and reactive to light. Neck:      Musculoskeletal: Normal range of motion and neck supple. Thyroid: No thyromegaly. Vascular: No JVD. Trachea: No tracheal deviation. Cardiovascular:      Rate and Rhythm: Normal rate and regular rhythm. Heart sounds: No murmur. No friction rub. Pulmonary:      Effort: Pulmonary effort is normal.      Breath sounds: Normal breath sounds. No stridor. Chest:      Chest wall: No tenderness. Abdominal:      General: Bowel sounds are normal. There is no distension. Palpations: Abdomen is soft. Tenderness: There is tenderness in the right lower quadrant. There is guarding. There is no right CVA tenderness or left CVA tenderness. Positive signs include McBurney's sign. Negative signs include Hernandez's sign, Rovsing's sign, psoas sign and obturator sign. Musculoskeletal: Normal range of motion. General: No tenderness. Lymphadenopathy:      Cervical: No cervical adenopathy. Skin:     General: Skin is warm and dry. Neurological:      Mental Status: She is alert. Cranial Nerves: No cranial nerve deficit. Coordination: Coordination normal.   Psychiatric:         Behavior: Behavior normal.         Thought Content: Thought content normal.         Judgment: Judgment normal.         Medical Decision Making   I am the first provider for this patient. I reviewed the vital signs, available nursing notes, past medical history, past surgical history, family history and social history. Provider Notes (Medical Decision Making): Patient presents emergency department right lower quadrant pain constant  Rule out appendicitis UTI pyelonephritis kidney stone  If CT negative for any acute finding I will pursue further and get pelvic ultrasound as well currently patient symptoms are pointing more towards abdominal organs. Torsion in the consideration but if patient improved and there is an diagnosis on the imaging/urinalysis I will not pursue that any further  Hydration symptomatic relief is started in the emergency department    Vital Signs-Reviewed the patient's vital signs. Pulse Oximetry Analysis -100%, room air, normal    Cardiac Monitor:  Rate/Rhythm: 130, sinus tach    EKG: Interpreted by the EP. Time of the EKG is 1240  Heart rate is 118, normal intervals and axis no sign of acute ischemia. ---  I evaluated this patient in the emergency department.   Patient continues to have right lower quadrant pain  CT nonspecific and questionable inflammation of the kidney consistent with pyelonephritis and her symptoms. I have aggressively started patient's treatment patient continues to be tachycardic and dehydrated and with abdominal pain I will consider admission and discussed this with the hospitalist.  12:48 PM         Vitals:    12/09/19 0849 12/09/19 1122 12/09/19 1130   BP: 108/74  113/64   Pulse: (!) 119  (!) 120   Resp: 16  20   Temp: 99.6 °F (37.6 °C)     SpO2: 100%  100%   Weight:  56.7 kg (125 lb)      1:50 PM - I suspect that this patient has an active infection. 1:50 PM - The patient met criteria for severe sepsis at this time. PROVIDER SEPSIS PHYSICAL EXAM EVAL  Vital signs reviewed (see nursing documentation for further details):  Vitals:    12/09/19 0849 12/09/19 1130   BP: 108/74 113/64   Pulse: (!) 119 (!) 120   Resp: 16 20   Temp: 99.6 °F (37.6 °C)    SpO2: 100% 100%       Cardiac exam:Tachycardic    Pulmonary exam:Normal    Peripheral pulses:Normal    Capillary refill:Normal    Skin exam:pink    Exam performed Yoel Zamora MD      Records Reviewed: Nursing Notes    ED Course: For Hospitalized Patients:    1. Hospitalization Decision Time:  The decision to hospitalize the patient was made by Dr. Reina Liu, 1:50 PM    2.  Aspirin: Aspirin was not given because the patient did not present with a stroke at the time of their Emergency Department evaluation    CRITICAL CARE NOTE :    1:49 PM      IMPENDING DETERIORATION -Cardiovascular and Metabolic    ASSOCIATED RISK FACTORS - Hypoxia, Metabolic changes and Dehydration    MANAGEMENT- Bedside Assessment    INTERPRETATION -  CT Scan and Blood Pressure    INTERVENTIONS - hemodynamic mngmt and Metobolic interventions    CASE REVIEW - Hospitalist    TREATMENT RESPONSE -Improved    PERFORMED BY - Self      NOTES   :    I have spent 30 minutes of critical care time involved in lab review, consultations with specialist, family decision- making, bedside attention and documentation. During this entire length of time I was immediately available to the patient . Fátima Ballesteros MD      Diagnostic Study Results     Labs -     Recent Results (from the past 12 hour(s))   CBC WITH AUTOMATED DIFF    Collection Time: 12/09/19  8:47 AM   Result Value Ref Range    WBC 16.0 (H) 4.6 - 13.2 K/uL    RBC 4.63 4.20 - 5.30 M/uL    HGB 13.7 12.0 - 16.0 g/dL    HCT 41.8 35.0 - 45.0 %    MCV 90.3 74.0 - 97.0 FL    MCH 29.6 24.0 - 34.0 PG    MCHC 32.8 31.0 - 37.0 g/dL    RDW 14.0 11.6 - 14.5 %    PLATELET 276 154 - 285 K/uL    MPV 10.1 9.2 - 11.8 FL    NEUTROPHILS 91 (H) 40 - 73 %    LYMPHOCYTES 7 (L) 21 - 52 %    MONOCYTES 2 (L) 3 - 10 %    EOSINOPHILS 0 0 - 5 %    BASOPHILS 0 0 - 2 %    ABS. NEUTROPHILS 14.7 (H) 1.8 - 8.0 K/UL    ABS. LYMPHOCYTES 1.1 0.9 - 3.6 K/UL    ABS. MONOCYTES 0.3 0.05 - 1.2 K/UL    ABS. EOSINOPHILS 0.0 0.0 - 0.4 K/UL    ABS. BASOPHILS 0.0 0.0 - 0.1 K/UL    DF AUTOMATED     METABOLIC PANEL, COMPREHENSIVE    Collection Time: 12/09/19  8:47 AM   Result Value Ref Range    Sodium 138 136 - 145 mmol/L    Potassium 3.4 (L) 3.5 - 5.5 mmol/L    Chloride 105 100 - 111 mmol/L    CO2 27 21 - 32 mmol/L    Anion gap 6 3.0 - 18 mmol/L    Glucose 102 (H) 74 - 99 mg/dL    BUN 7 7.0 - 18 MG/DL    Creatinine 0.87 0.6 - 1.3 MG/DL    BUN/Creatinine ratio 8 (L) 12 - 20      GFR est AA >60 >60 ml/min/1.73m2    GFR est non-AA >60 >60 ml/min/1.73m2    Calcium 8.8 8.5 - 10.1 MG/DL    Bilirubin, total 0.9 0.2 - 1.0 MG/DL    ALT (SGPT) 70 (H) 13 - 56 U/L    AST (SGOT) 28 10 - 38 U/L    Alk.  phosphatase 72 45 - 117 U/L    Protein, total 7.8 6.4 - 8.2 g/dL    Albumin 4.4 3.4 - 5.0 g/dL    Globulin 3.4 2.0 - 4.0 g/dL    A-G Ratio 1.3 0.8 - 1.7     LIPASE    Collection Time: 12/09/19  8:47 AM   Result Value Ref Range    Lipase 136 73 - 393 U/L   URINALYSIS W/ RFLX MICROSCOPIC    Collection Time: 12/09/19  8:47 AM   Result Value Ref Range Color YELLOW      Appearance CLEAR      Specific gravity 1.019 1.005 - 1.030      pH (UA) 6.0 5.0 - 8.0      Protein TRACE (A) NEG mg/dL    Glucose NEGATIVE  NEG mg/dL    Ketone TRACE (A) NEG mg/dL    Bilirubin NEGATIVE  NEG      Blood SMALL (A) NEG      Urobilinogen 1.0 0.2 - 1.0 EU/dL    Nitrites NEGATIVE  NEG      Leukocyte Esterase SMALL (A) NEG     HCG URINE, QL    Collection Time: 12/09/19  8:47 AM   Result Value Ref Range    HCG urine, QL NEGATIVE  NEG     URINE MICROSCOPIC ONLY    Collection Time: 12/09/19  8:47 AM   Result Value Ref Range    WBC 4 to 10 0 - 4 /hpf    RBC 2 to 5 0 - 5 /hpf    Epithelial cells 3+ 0 - 5 /lpf    Bacteria 3+ (A) NEG /hpf    Mucus 2+ (A) NEG /lpf   POC LACTIC ACID    Collection Time: 12/09/19 11:51 AM   Result Value Ref Range    Lactic Acid (POC) 0.93 0.40 - 2.00 mmol/L   EKG, 12 LEAD, INITIAL    Collection Time: 12/09/19 12:39 PM   Result Value Ref Range    Ventricular Rate 118 BPM    Atrial Rate 118 BPM    P-R Interval 140 ms    QRS Duration 84 ms    Q-T Interval 316 ms    QTC Calculation (Bezet) 442 ms    Calculated P Axis 56 degrees    Calculated R Axis 58 degrees    Calculated T Axis 14 degrees    Diagnosis       Sinus tachycardia  Nonspecific ST and T wave abnormality  Abnormal ECG  When compared with ECG of 19-DEC-2015 18:32,  No significant change was found         Radiologic Studies -   US TRANSVAGINAL W DOPPLER   Final Result   IMPRESSION:      1. Normal blood flow to each ovary. No findings to suggest ovarian torsion. 2. Complex left ovarian structure, the appearance of which favors a small   hemorrhagic cyst.   3. Intrauterine contraceptive device in expected position. XR CHEST PORT   Final Result   IMPRESSION:      No acute radiographic cardiopulmonary abnormality. CT ABD PELV W CONT   Final Result   IMPRESSION:      1. Findings of right-sided pyelitis and likely early changes of pyelonephritis   involving the right kidney.  No evidence of hydronephrosis or urolithiasis. 2. Normal caliber small and large bowel, to include a normal appendix. CT Results  (Last 48 hours)               12/09/19 1035  CT ABD PELV W CONT Final result    Impression:  IMPRESSION:       1. Findings of right-sided pyelitis and likely early changes of pyelonephritis   involving the right kidney. No evidence of hydronephrosis or urolithiasis. 2. Normal caliber small and large bowel, to include a normal appendix. Narrative:  EXAM: CT of the abdomen and pelvis       INDICATION: Right-sided/lower abdominal pain, fever and chills       COMPARISON: CT 8/24/2018       TECHNIQUE: Axial CT imaging of the abdomen and pelvis was performed with   intravenous contrast. Multiplanar reformats were generated. One or more dose reduction techniques were used on this CT: automated exposure   control, adjustment of the mAs and/or kVp according to patient size, and   iterative reconstruction techniques. The specific techniques used on this CT   exam have been documented in the patient's electronic medical record. Digital   Imaging and Communications in Medicine (DICOM) format image data are available   to nonaffiliated external healthcare facilities or entities on a secure, media   free, reciprocally searchable basis with patient authorization for at least a   12-month period after this study. _______________       FINDINGS:       LOWER CHEST: Basilar changes of atelectasis. No alveolar consolidation. No   pleural effusion. Small fissural lymph node (series 4, image 7) unchanged. Normal cardiac size. No pericardial effusion. LIVER, BILIARY: Liver is normal. No biliary dilation. Prior cholecystectomy.        PANCREAS: Normal.       SPLEEN: Normal.       ADRENALS: Normal.       KIDNEYS/URETERS/BLADDER: There are faint areas of intraparenchymal low   attenuation noted involving the interpolar and upper pole regions of the right   kidney there is considerable urothelial enhancement is noted without evidence of   hydronephrosis or urolithiasis. Left kidney is normal in CT appearance. Urinary   bladder unremarkable. PELVIC ORGANS: Intrauterine contraceptive device present in expected position. Small bilateral ovarian cysts or follicles. VASCULATURE: Unremarkable       LYMPH NODES: No enlarged lymph nodes. GASTROINTESTINAL TRACT: No bowel dilation or wall thickening. Normal appendix. No morphology of bowel obstruction. No free intraperitoneal gas. BONES: No acute osseous abnormality. No suspicious lytic or blastic osseous   lesions. OTHER: None.       _______________               CXR Results  (Last 48 hours)               12/09/19 1137  XR CHEST PORT Final result    Impression:  IMPRESSION:       No acute radiographic cardiopulmonary abnormality. Narrative:  EXAM: XR CHEST PORT       CLINICAL INDICATION/HISTORY: meets SIRS criteria   -Additional: None       COMPARISON: 8/24/2018; prior abdominal/pelvic CT       TECHNIQUE: Frontal view of the chest       _______________       FINDINGS:       HEART AND MEDIASTINUM: Normal cardiac size and mediastinal contours. LUNGS AND PLEURAL SPACES: No focal pneumonic consolidation, pneumothorax, or   pleural effusion. BONY THORAX AND SOFT TISSUES: No acute osseous abnormality       _______________                     Discharge     Clinical Impression:   1. RLQ abdominal pain    2. Pyelonephritis    3.  Tachycardia        Disposition:  Admit    It should be noted that I will be the provider of record for this patient  Сергей Chandra MD      Follow-up Information    None         Current Discharge Medication List

## 2019-12-09 NOTE — ED TRIAGE NOTES
\"I have pain in the right side of my lower stomach for three days. I had a fever last night and chills. It hurts really bad when I move my legs. My last period was on Friday. \"

## 2019-12-09 NOTE — H&P
Internal Medicine History and Physical          Subjective     HPI: Saturnino Wesley is a 34 y.o. female with no sig PMHx who presented to the ED with new onset abd pain/flank pain and urinary urgency. She admits to back pain for the last month and has been on gabapentin for it. The abd pain/nausea and flank pain started about 3 days ago while the urgency started about a week ago. She started feeling worse over the weekend and had 100.9 fever yesterday. She denied any other symptoms. In the ED, imaging showed pyelonephritis and she was given IV antibiotics. She was septic given tachycardia and leukocytosis. PMHx:  History reviewed. No pertinent past medical history.     PSurgHx:  Past Surgical History:   Procedure Laterality Date    HX CHOLECYSTECTOMY      HX GYN      C Section and fallopian tube removal       SocialHx:  Social History     Socioeconomic History    Marital status:      Spouse name: Not on file    Number of children: Not on file    Years of education: Not on file    Highest education level: Not on file   Occupational History    Not on file   Social Needs    Financial resource strain: Not on file    Food insecurity:     Worry: Not on file     Inability: Not on file    Transportation needs:     Medical: Not on file     Non-medical: Not on file   Tobacco Use    Smoking status: Never Smoker    Smokeless tobacco: Never Used   Substance and Sexual Activity    Alcohol use: Not on file    Drug use: No    Sexual activity: Not on file   Lifestyle    Physical activity:     Days per week: Not on file     Minutes per session: Not on file    Stress: Not on file   Relationships    Social connections:     Talks on phone: Not on file     Gets together: Not on file     Attends Latter day service: Not on file     Active member of club or organization: Not on file     Attends meetings of clubs or organizations: Not on file     Relationship status: Not on file    Intimate partner violence: Fear of current or ex partner: Not on file     Emotionally abused: Not on file     Physically abused: Not on file     Forced sexual activity: Not on file   Other Topics Concern    Not on file   Social History Narrative    Not on file       Allergies:  No Known Allergies    FamilyHx:  History reviewed. No pertinent family history. Prior to Admission Medications   Prescriptions Last Dose Informant Patient Reported? Taking?   famotidine (PEPCID) 20 mg tablet   No No   Sig: Take 1 Tab by mouth two (2) times a day. ondansetron (ZOFRAN ODT) 4 mg disintegrating tablet   No No   Sig: Take 1 Tab by mouth every eight (8) hours as needed for Nausea. sucralfate (CARAFATE) 100 mg/mL suspension   No No   Sig: Take 10 mL by mouth four (4) times daily.       Facility-Administered Medications: None       Review of Systems:  CONST: Fever, weight loss, fatigue or chills  HEENT: Recent changes in vision, vertigo, epistaxis, dysphagia and hoarseness  CV: Chest pain, palpitations, edema and varicosities  RESP: Cough, shortness of breath, wheezing, hemoptysis, snoring and reactive airway disease  GI: Nausea, vomiting, abdominal pain, change in bowel habits, hematochezia, melena, and GERD   : Hematuria, dysuria, frequency, urgency, nocturia and stress urinary incontinence   MS: Weakness, joint pain and arthritis  ENDO: Polyuria, polydipsia, polyphagia, poor wound healing, heat intolerance, cold intolerance  LYMPH/HEME: Anemia, bruising and history of blood transfusions  INTEG: Dermatitis, abnormal moles  NEURO: Dizziness, headache, fainting, seizures and stroke  PSYCH: Anxiety and depression      Objective      Visit Vitals  /58   Pulse (!) 117   Temp 99.6 °F (37.6 °C)   Resp 22   Wt 56.7 kg (125 lb)   SpO2 100%   BMI 22.86 kg/m²       Physical Exam:  General Appearance: NAD, conversant, flushed  HENT: normocephalic/atraumatic, moist mucus membranes  Lungs: CTA with normal respiratory effort  Cardiovascular: RRR, no m/r/g, capillary refill < 2 sec, B/L DP/PT pulses +3/4  Abdomen: soft, TTP RLQ, + flank pain on R, normal bowel sounds  Extremities: no cyanosis, no peripheral edema  Neuro: moves all extremities, no focal deficits  Psych: appropriate affect, alert and oriented to person, place and time    Laboratory Studies:  BMP:   Lab Results   Component Value Date/Time     12/09/2019 08:47 AM    K 3.4 (L) 12/09/2019 08:47 AM     12/09/2019 08:47 AM    CO2 27 12/09/2019 08:47 AM    AGAP 6 12/09/2019 08:47 AM     (H) 12/09/2019 08:47 AM    BUN 7 12/09/2019 08:47 AM    CREA 0.87 12/09/2019 08:47 AM    GFRAA >60 12/09/2019 08:47 AM    GFRNA >60 12/09/2019 08:47 AM     CBC:   Lab Results   Component Value Date/Time    WBC 16.0 (H) 12/09/2019 08:47 AM    HGB 13.7 12/09/2019 08:47 AM    HCT 41.8 12/09/2019 08:47 AM     12/09/2019 08:47 AM       Imaging Reviewed:  Ct Abd Pelv W Cont    Result Date: 12/9/2019  EXAM: CT of the abdomen and pelvis INDICATION: Right-sided/lower abdominal pain, fever and chills COMPARISON: CT 8/24/2018 TECHNIQUE: Axial CT imaging of the abdomen and pelvis was performed with intravenous contrast. Multiplanar reformats were generated. One or more dose reduction techniques were used on this CT: automated exposure control, adjustment of the mAs and/or kVp according to patient size, and iterative reconstruction techniques. The specific techniques used on this CT exam have been documented in the patient's electronic medical record. Digital Imaging and Communications in Medicine (DICOM) format image data are available to nonaffiliated external healthcare facilities or entities on a secure, media free, reciprocally searchable basis with patient authorization for at least a 12-month period after this study. _______________ FINDINGS: LOWER CHEST: Basilar changes of atelectasis. No alveolar consolidation. No pleural effusion. Small fissural lymph node (series 4, image 7) unchanged.  Normal cardiac size. No pericardial effusion. LIVER, BILIARY: Liver is normal. No biliary dilation. Prior cholecystectomy. PANCREAS: Normal. SPLEEN: Normal. ADRENALS: Normal. KIDNEYS/URETERS/BLADDER: There are faint areas of intraparenchymal low attenuation noted involving the interpolar and upper pole regions of the right kidney there is considerable urothelial enhancement is noted without evidence of hydronephrosis or urolithiasis. Left kidney is normal in CT appearance. Urinary bladder unremarkable. PELVIC ORGANS: Intrauterine contraceptive device present in expected position. Small bilateral ovarian cysts or follicles. VASCULATURE: Unremarkable LYMPH NODES: No enlarged lymph nodes. GASTROINTESTINAL TRACT: No bowel dilation or wall thickening. Normal appendix. No morphology of bowel obstruction. No free intraperitoneal gas. BONES: No acute osseous abnormality. No suspicious lytic or blastic osseous lesions. OTHER: None. _______________     IMPRESSION: 1. Findings of right-sided pyelitis and likely early changes of pyelonephritis involving the right kidney. No evidence of hydronephrosis or urolithiasis. 2. Normal caliber small and large bowel, to include a normal appendix. Xr Chest Port    Result Date: 12/9/2019  EXAM: XR CHEST PORT CLINICAL INDICATION/HISTORY: meets SIRS criteria -Additional: None COMPARISON: 8/24/2018; prior abdominal/pelvic CT TECHNIQUE: Frontal view of the chest _______________ FINDINGS: HEART AND MEDIASTINUM: Normal cardiac size and mediastinal contours. LUNGS AND PLEURAL SPACES: No focal pneumonic consolidation, pneumothorax, or pleural effusion. BONY THORAX AND SOFT TISSUES: No acute osseous abnormality _______________     IMPRESSION: No acute radiographic cardiopulmonary abnormality. Us Transvaginal W Doppler    Result Date: 12/9/2019  Ultrasound Pelvis: Transabdominal INDICATION: 31-year-old patient with right-sided pelvic pain. COMPARISON: CT 12/9/2019.  TECHNIQUE: Real-time transvaginal sonography in multiple planes was performed with image documentation. Grayscale, color flow Doppler imaging, and velocity spectral waveform analysis of the ovaries  was performed (duplex imaging). FINDINGS: UTERUS: Normal in size and echotexture measuring  8.6 x 3.8 x 3.8 cm. No fibroids or masses identified. ENDOMETRIUM: Intrauterine contraceptive device present in expected position. 0.4 cm. RIGHT OVARY and ADNEXA: Right ovary is normal in size and echotexture measuring 3.5 x 2.8 x 1.7 cm. No ovarian or adnexal masses identified. Color and spectral Doppler demonstrate normal flow to the right ovary with no evidence of ovarian torsion. The systolic and venous waveforms are within normal limits. LEFT OVARY and ADNEXA: Left ovary is normal in size and echotexture measuring 3.0 x 2.6 x 1.8 cm. Complex appearing left ovarian structure with lacy internal septations measuring 2.0 x 1.8 x 1.9 cm. Color and spectral Doppler demonstrate normal flow to the left ovary with no evidence of ovarian torsion. The systolic and venous waveforms are within normal limits. . OTHER: No free fluid identified. IMPRESSION: 1. Normal blood flow to each ovary. No findings to suggest ovarian torsion. 2. Complex left ovarian structure, the appearance of which favors a small hemorrhagic cyst. 3. Intrauterine contraceptive device in expected position. Assessment/Plan     Active Hospital Problems    Diagnosis Date Noted    Sepsis ([facility] [address] 12/09/2019    Pyelonephritis 12/09/2019     - Cont IVAB  - Cont IV fluids  - Trend white count  - F/u blood/urine cult  - Pain meds PRN  - Cont acceptable home medications for chronic conditions   - DVT protocol    I have personally reviewed all pertinent labs, films and EKGs that have officially resulted. I reviewed available electronic documentation outlining the initial presentation as well as the emergency room physician's encounter.     Juwan Tellez,   Internal Medicine, Hospitalist  Pager: [phone] [facility]

## 2019-12-09 NOTE — ED NOTES
TRANSFER - ED to INPATIENT REPORT:    SBAR report made available to receiving floor on this patient being transferred to [address] [place] [address] ([facility])  for routine progression of care       Admitting diagnosis Sepsis ([facility] [address] [A41.9]    Information from the following report(s) SBAR was made available to receiving floor. Lines:   Peripheral IV 12/09/19 Right Antecubital (Active)   Site Assessment Clean, dry, & intact 12/9/2019 11:45 AM   Phlebitis Assessment 0 12/9/2019 11:45 AM   Infiltration Assessment 0 12/9/2019 11:45 AM   Dressing Status Clean, dry, & intact 12/9/2019 11:45 AM   Dressing Type Transparent 12/9/2019 11:45 AM   Hub Color/Line Status Pink 12/9/2019 11:45 AM       Peripheral IV 12/09/19 (Active)        Medication list confirmed with patient    Opportunity for questions and clarification was provided.       Patient is oriented to time, place, person and situation   Patient is  continent and ambulatory without assist     Valuables transported with patient     Patient transported with:   Tech    MAP (Monitor): 73 =Monitored (most recent)  Vitals w/ MEWS Score (last day)     Date/Time MEWS Score Pulse Resp Temp BP Level of Consciousness SpO2    12/09/19 1545    (!) 109  22    102/63    100 %    12/09/19 1530    (!) 110  20    106/62    99 %    12/09/19 1515    (!) 118  23    113/65    100 %    12/09/19 1415    (!) 117  22    106/58    100 %    12/09/19 1400    (!) 118  22    106/65    100 %    12/09/19 1345    (!) 107  22    104/61    100 %    12/09/19 1315    (!) 113  22    110/66    100 %    12/09/19 1130    (!) 120  20    113/64    100 %    12/09/19 0849  3  (!) 119  16  99.6 °F (37.6 °C)  108/74  Alert  100 %              Septic Patients:     Lactic Acid  Lab Results   Component Value Date    LACPOC 0.93 12/09/2019    (Most recent on top)  Repeat drawn: NO Time: N/A     ALL LACTIC ACIDS GREATER THAN 2 MUST BE REPEATED POC WITHIN 4 HOURS OR PRIOR TO ADMISSION               Total Fluid Bolus initiated and documented on MAR: YES    All ordered antibiotics initiated within first 3 hours of TIME ZERO?   YES

## 2019-12-09 NOTE — PROGRESS NOTES
conducted an initial consultation and Spiritual Assessment for Ishmael Johnson, who is a 34 y.o.,female. According to the patients chart Spiritism Affiliation is: No Sabianist. The reason the Patient came to the hospital is:   Patient Active Problem List    Diagnosis Date Noted    Sepsis ([facility] [address] 12/09/2019        The  provided the following Interventions:  Initiated a relationship of care and support. Explored issues of itzel, belief, spirituality and Hindu/ritual needs while hospitalized. Listened empathically. Provided information about Spiritual Care Services. Offered prayer and assurance of continued prayers on patients behalf. Chart reviewed. The following outcomes were achieved:  Patient processed feelings about current hospitalization. Patient expressed gratitude for Spiritual Care visit. Assessment:  There are no significant spiritual or Hindu issues which require further intervention at this time. Patient does not have any Hindu or cultural needs that will affect patients preferences in health care. Plan:  Chaplains will continue to follow and will provide pastoral care as needed or requested.  recommends bedside caregivers page  on duty if patient shows signs of acute spiritual or emotional distress. [address] [facility] [address]   [address] [facility] [address] - Office

## 2019-12-10 LAB
ATRIAL RATE: 118 BPM
BASOPHILS # BLD: 0 K/UL (ref 0–0.1)
BASOPHILS NFR BLD: 0 % (ref 0–2)
CALCULATED P AXIS, ECG09: 56 DEGREES
CALCULATED R AXIS, ECG10: 58 DEGREES
CALCULATED T AXIS, ECG11: 14 DEGREES
DIAGNOSIS, 93000: NORMAL
DIFFERENTIAL METHOD BLD: ABNORMAL
EOSINOPHIL # BLD: 0 K/UL (ref 0–0.4)
EOSINOPHIL NFR BLD: 0 % (ref 0–5)
ERYTHROCYTE [DISTWIDTH] IN BLOOD BY AUTOMATED COUNT: 14.3 % (ref 11.6–14.5)
HCT VFR BLD AUTO: 35.7 % (ref 35–45)
HGB BLD-MCNC: 11.4 G/DL (ref 12–16)
LACTATE SERPL-SCNC: 1.4 MMOL/L (ref 0.4–2)
LYMPHOCYTES # BLD: 1.4 K/UL (ref 0.9–3.6)
LYMPHOCYTES NFR BLD: 17 % (ref 21–52)
MCH RBC QN AUTO: 29.5 PG (ref 24–34)
MCHC RBC AUTO-ENTMCNC: 31.9 G/DL (ref 31–37)
MCV RBC AUTO: 92.2 FL (ref 74–97)
MONOCYTES # BLD: 1.1 K/UL (ref 0.05–1.2)
MONOCYTES NFR BLD: 13 % (ref 3–10)
NEUTS SEG # BLD: 5.6 K/UL (ref 1.8–8)
NEUTS SEG NFR BLD: 70 % (ref 40–73)
P-R INTERVAL, ECG05: 140 MS
PLATELET # BLD AUTO: 128 K/UL (ref 135–420)
PMV BLD AUTO: 10.5 FL (ref 9.2–11.8)
Q-T INTERVAL, ECG07: 316 MS
QRS DURATION, ECG06: 84 MS
QTC CALCULATION (BEZET), ECG08: 442 MS
RBC # BLD AUTO: 3.87 M/UL (ref 4.2–5.3)
VENTRICULAR RATE, ECG03: 118 BPM
WBC # BLD AUTO: 8 K/UL (ref 4.6–13.2)

## 2019-12-10 PROCEDURE — 83605 ASSAY OF LACTIC ACID: CPT

## 2019-12-10 PROCEDURE — 74011250637 HC RX REV CODE- 250/637: Performed by: INTERNAL MEDICINE

## 2019-12-10 PROCEDURE — 74011250636 HC RX REV CODE- 250/636: Performed by: INTERNAL MEDICINE

## 2019-12-10 PROCEDURE — 65270000029 HC RM PRIVATE

## 2019-12-10 PROCEDURE — 74011250637 HC RX REV CODE- 250/637: Performed by: HOSPITALIST

## 2019-12-10 PROCEDURE — 74011250636 HC RX REV CODE- 250/636: Performed by: HOSPITALIST

## 2019-12-10 PROCEDURE — 74011000258 HC RX REV CODE- 258: Performed by: HOSPITALIST

## 2019-12-10 PROCEDURE — 36415 COLL VENOUS BLD VENIPUNCTURE: CPT

## 2019-12-10 PROCEDURE — 85025 COMPLETE CBC W/AUTO DIFF WBC: CPT

## 2019-12-10 RX ADMIN — SODIUM CHLORIDE 500 ML: 900 INJECTION, SOLUTION INTRAVENOUS at 03:41

## 2019-12-10 RX ADMIN — DEXTROSE MONOHYDRATE AND SODIUM CHLORIDE 100 ML/HR: 5; .9 INJECTION, SOLUTION INTRAVENOUS at 14:30

## 2019-12-10 RX ADMIN — OXYCODONE HYDROCHLORIDE AND ACETAMINOPHEN 1 TABLET: 5; 325 TABLET ORAL at 09:34

## 2019-12-10 RX ADMIN — HYDROMORPHONE HYDROCHLORIDE 0.5 MG: 1 INJECTION, SOLUTION INTRAMUSCULAR; INTRAVENOUS; SUBCUTANEOUS at 16:31

## 2019-12-10 RX ADMIN — CEFTRIAXONE 1 G: 1 INJECTION, POWDER, FOR SOLUTION INTRAMUSCULAR; INTRAVENOUS at 11:37

## 2019-12-10 RX ADMIN — OXYCODONE HYDROCHLORIDE AND ACETAMINOPHEN 1 TABLET: 5; 325 TABLET ORAL at 13:38

## 2019-12-10 RX ADMIN — OXYCODONE HYDROCHLORIDE AND ACETAMINOPHEN 1 TABLET: 5; 325 TABLET ORAL at 22:39

## 2019-12-10 RX ADMIN — SODIUM CHLORIDE 500 ML: 900 INJECTION, SOLUTION INTRAVENOUS at 03:00

## 2019-12-10 RX ADMIN — ACETAMINOPHEN 650 MG: 325 TABLET, FILM COATED ORAL at 22:01

## 2019-12-10 RX ADMIN — ACETAMINOPHEN 650 MG: 325 TABLET, FILM COATED ORAL at 11:39

## 2019-12-10 RX ADMIN — ONDANSETRON 4 MG: 2 INJECTION INTRAMUSCULAR; INTRAVENOUS at 09:30

## 2019-12-10 RX ADMIN — ONDANSETRON 4 MG: 2 INJECTION INTRAMUSCULAR; INTRAVENOUS at 16:30

## 2019-12-10 RX ADMIN — ONDANSETRON 4 MG: 2 INJECTION INTRAMUSCULAR; INTRAVENOUS at 22:04

## 2019-12-10 NOTE — PROGRESS NOTES
NUTRITION INITIAL ASSESSMENT/PLAN OF CARE      RECOMMENDATIONS:   1. Continue current diet, encouraged intake and to implement preferences  2. Monitor labs, weight and PO intake  3. RD to follow     GOALS:   1. PO intake meets >75% of protein/calorie needs by 12/13/19  2. Weight Maintenance (+/- 1-2 lb) by 12/17/19     ASSESSMENT:   Wt status is classified as normal per Body mass index is 22.86 kg/m². Pt reports decline in intake/appetite since Sunday and nausea. Diagnosis: sepsis, pyelonephritis. Nutrition recommendations listed. RD to follow. Nutrition Diagnoses:   Inadequate intake related to poor appetite/nausea as evidenced by little to no intake reported x 2 days    SUBJECTIVE/OBJECTIVE:   Pt seen for an initial assessment/sepsis. Pt admit with abd pain. Pt sitting up in bed during time of assessment with lunch tray in room, pt consumed 25% of soup/sandwich. Pt states today is the first time she has tried to eat since Sunday. When feeling well, pt normally eats 3 meals/day. Pt states nausea but that zofran is helping. Per I/Os Bm 12/9/19. Denies issues chewing/swallowing. NKFA. Pt states -125 lb. Encouraged pt to implement preferences/needs with catering associates to increase PO intake. Will continue to monitor intake, weight, labs. Information Obtained from:    [x] Chart Review   [x] Patient   [] Family/Caregiver   [] Nurse/Physician   [] Interdisciplinary Meeting/Rounds    Diet: Regular  Medications: [x] Reviewed  Noted: rocephin, D5 NaCl @ 100, PRN: oxycodone, zofran  Allergies: [x] Reviewed   Encounter Diagnoses     ICD-10-CM ICD-9-CM   1. RLQ abdominal pain R10.31 789.03   2. Pyelonephritis N12 590.80   3. Tachycardia R00.0 785.0     History reviewed. No pertinent past medical history.    Labs:    Lab Results   Component Value Date/Time    Sodium 138 12/09/2019 08:47 AM    Potassium 3.4 (L) 12/09/2019 08:47 AM    Chloride 105 12/09/2019 08:47 AM    CO2 27 12/09/2019 08:47 AM    Anion gap 6 12/09/2019 08:47 AM    Glucose 102 (H) 12/09/2019 08:47 AM    BUN 7 12/09/2019 08:47 AM    Creatinine 0.87 12/09/2019 08:47 AM    Calcium 8.8 12/09/2019 08:47 AM    Magnesium 1.9 12/09/2019 08:47 AM    Albumin 4.4 12/09/2019 08:47 AM     No results found for: GLU, GLUPOC, GLUCPOC  No results found for: CHOL, CHOLPOCT, CHOLX, CHLST, CHOLV, HDL, HDLPOC, HDLP, LDL, LDLCPOC, LDLC, DLDLP, VLDLC, VLDL, TGLX, TRIGL, TRIGP, TGLPOCT, CHHD, CHHDX  Anthropometrics: BMI (calculated): 22.9  Last 3 Recorded Weights in this Encounter    12/09/19 1122   Weight: 56.7 kg (125 lb)      Ht Readings from Last 1 Encounters:   12/10/19 5' 2\" (1.575 m)     Documented Weight History:  Weight Metrics 12/9/2019 8/24/2018 12/19/2015   Weight 125 lb 115 lb 108 lb   BMI 22.86 kg/m2 21.03 kg/m2 19.75 kg/m2     Patient Vitals for the past 100 hrs:   % Diet Eaten   12/10/19 1230 25 %   12/10/19 0900 25 %     IBW: 110 lb %IBW: 114% UBW: 120-125 lb   [] Weight Loss [x] Weight Gain per history above [x] Weight Stable per pt    Estimated Nutrition Needs: [] MSJ  [x] Other:  Calories: 9662-3998 kcal (30-35 kcal/kg)  Based on:   [x] Actual BW    Protein:   45-57 g Based on:   [x] Actual BW    Fluid:      1 mL/kcal       [x] No Cultural, Confucianism or ethnic dietary need identified.     [] Cultural, Confucianism and ethnic food preferences identified and addressed     Wt Status:  [x] Normal (18.6 - 24.9) [] Underweight (<18.5) [] Overweight (25 - 29.9) [] Mild Obesity (30 - 34.9)  [] Moderate Obesity (35 - 39.9) [] Morbid Obesity (40+)     Nutrition Problems Identified:   [x] Suboptimal PO intake since [place]  [] Food Allergies  [] Difficulty chewing/swallowing/poor dentition  [] Constipation/Diarrhea   [x] Nausea/Vomiting   [] None  [] Other:     Plan:   [] Therapeutic Diet  []  Obtained/adjusted food preferences/tolerances and/or snacks options   []  Supplements added   [] Occupational therapy following for feeding techniques  []  HS snack added   [] Modify diet texture   []  Modify diet for food allergies   []  Educate patient   []  Assist with menu selection   [x]  Monitor PO intake on meal rounds   [x]  Continue inpatient monitoring and intervention   [x]  Participated in discharge planning/Interdisciplinary rounds/Team meetings   []  Other:     Education Needs:   [] Not appropriate for teaching at this time due to:   [x] Identified and addressed encouraged intake    Nutrition Monitoring and Evaluation:  [x] Continue ongoing monitoring and intervention  [] Other    Suzi Woods

## 2019-12-10 NOTE — PROGRESS NOTES
INTERIM UPDATE - 9665 EST on 12/10/2019    Nursing Staff reports Blood Pressures with MAP of 58. Patient reports that her Systolic Blood Pressure is often less than 120 mm Hg, but not terribly low. Patient also reports that she is experiencing a little bit of weakness and nausea presently, though overall she is feeling better. Discussed with Patient the possibility of needing a [employer] and [employer] if her Blood Pressure does not improve with administration of IV fluids. Plan:  Ordered IV NS bolus 500 mL x2 with Blood Pressure rechecks in between. If Patient does not improve with both boluses, plan to administer an IV push Phenylephrine and monitor blood pressure improvement. Goal Blood Pressure is MAP >=65.         INTERIM UPDATE - 5815 EST on 12/10/2019    Focused Exam for Possible Hypoperfusion:    Vitals:  Temperature:  As charted at approximately this time    Heart Rate:  Regular  Lungs:  Clear, Unlabored    Capillary Refill Evaluation:  <1 Seconds    Peripheral Pulse Evaluation:  RUE: 2+ LUE: 2+  RLE: 2+ LLE: 2+    Skin Examination:  Pink , Unremarkable    Time of Examination (if different from the main interview/examination):  0255 EST on 12/10/2019

## 2019-12-10 NOTE — PROGRESS NOTES
Problem: Falls - Risk of  Goal: *Absence of Falls  Description  Document Keira Kellogg Fall Risk and appropriate interventions in the flowsheet.   Outcome: Progressing Towards Goal  Note: Fall Risk Interventions:  Mobility Interventions: Patient to call before getting OOB         Medication Interventions: Patient to call before getting OOB    Elimination Interventions: Call light in reach, Toileting schedule/hourly rounds              Problem: Patient Education: Go to Patient Education Activity  Goal: Patient/Family Education  Outcome: Progressing Towards Goal     Problem: Pain  Goal: *Control of Pain  Outcome: Progressing Towards Goal     Problem: Patient Education: Go to Patient Education Activity  Goal: Patient/Family Education  Outcome: Progressing Towards Goal     Problem: Hypotension  Goal: *Blood pressure within specified parameters  Outcome: Progressing Towards Goal  Goal: *Fluid volume balance  Outcome: Progressing Towards Goal  Goal: *Labs within defined limits  Outcome: Progressing Towards Goal     Problem: Patient Education: Go to Patient Education Activity  Goal: Patient/Family Education  Outcome: Progressing Towards Goal     Problem: Nutrition Deficit  Goal: *Optimize nutritional status  Outcome: Progressing Towards Goal

## 2019-12-10 NOTE — PROGRESS NOTES
Discharge/Transition Planning    Problem: Discharge Planning  Goal: *Discharge to safe environment  Outcome: Resolved/Met   Home        Reason for Admission:   Sepsis                   RRAT Score:          3       Plan for utilizing home health:          n/a                    Current Advanced Directive/Advance Care Plan: none                         Transition of Care Plan:         Interviewed patient. Verified demographics listed on face sheet with patient; all information correct. 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 Patient lives in single family home. Patient's NOK is spouse. Patient independent with ADLs prior to admission. No use of DME prior to admission. Discharge plan is    Home      Patient has designated __spouse______________________ to participate in his/her discharge plan and to receive any needed information. Kamran Mitchell Spouse [phone]     Care Management Interventions  PCP Verified by CM: Yes(sees [facility] [place])  Mode of Transport at Discharge:  Other (see comment)  Transition of Care Consult (CM Consult): Discharge Planning  Current Support Network: Lives with Spouse, Own Home  Confirm Follow Up Transport: Self  Plan discussed with Pt/Family/Caregiver: Yes  Discharge Location  Discharge Placement: Home

## 2019-12-10 NOTE — PROGRESS NOTES
0710: Bedside shift change report given to Sea RN  (oncoming nurse) by Maribel Estrada RN  (offgoing nurse). Report included the following information SBAR, Kardex, Procedure Summary, Intake/Output and MAR.     0932: Assessment done and pain medications administered. 1137: Patient had fever, tylenol and antibiotics given. 1215: MD stated that IV pain could be given if the patient SBP was above 105.     1331: Reassessment done on patient. 1400: New IVF hung.     1600: Reassessment done on patient. 1620: Spoke with MD about the patient's BP and IV pain medication. He stated okay to give 0.5mg Dilaudid. 1630: medications given. 2020: Bedside shift change report give Ainrudh Magdaleno RN (oncoming nurse) by Fritz Lai RN  (offgoing nurse). Report included the following information SBAR, Kardex, Procedure Summary, Intake/Output and MAR.

## 2019-12-10 NOTE — PROGRESS NOTES
Internal Medicine Progress Note    Patient's Name: Carlos Sutton Date: 12/9/2019  Length of Stay: 1      Assessment/Plan     Active Hospital Problems    Diagnosis Date Noted    Sepsis ([address] 12/09/2019    Pyelonephritis 12/09/2019     - Cont IV rocephin  - Cont IV fluids  - Urine cult w/ GNR  - Tylenol PRN fevers  - Cont pain meds PRN  - Cont acceptable home medications for chronic conditions   - DVT protocol    I have personally reviewed all pertinent labs and films that have officially resulted over the last 24 hours. I have personally checked for all pending labs that are awaiting final results. Subjective     Pt s/e @ bedside  Not feeling better, but not feeling worse  Febrile overnight w/ Tmax 102.8  Had some low BPs early AM, improved w/ boluses  Denies CP or SOB    Objective     Visit Vitals  /66   Pulse 93   Temp 99.1 °F (37.3 °C)   Resp 16   Ht 5' 2\" (1.575 m)   Wt 56.7 kg (125 lb)   SpO2 97%   Breastfeeding No   BMI 22.86 kg/m²       Physical Exam:  General Appearance: NAD, conversant  Lungs: CTA with normal respiratory effort  CV: RRR, no m/r/g  Abdomen: soft, TTP RLQ, costovertebral TTP R, normal bowel sounds  Extremities: no cyanosis, no peripheral edema  Neuro: No focal deficits, motor/sensory intact    Lab/Data Reviewed:  BMP: No results found for: NA, K, CL, CO2, AGAP, GLU, BUN, CREA, GFRAA, GFRNA  CBC:   Lab Results   Component Value Date/Time    WBC 8.0 12/10/2019 05:10 AM    HGB 11.4 (L) 12/10/2019 05:10 AM    HCT 35.7 12/10/2019 05:10 AM     (L) 12/10/2019 05:10 AM       Imaging Reviewed:  No results found.     Medications Reviewed:  Current Facility-Administered Medications   Medication Dose Route Frequency    sodium chloride (NS) flush 5-10 mL  5-10 mL IntraVENous PRN    dextrose 5% and 0.9% NaCl infusion  100 mL/hr IntraVENous CONTINUOUS    acetaminophen (TYLENOL) tablet 650 mg  650 mg Oral Q4H PRN    oxyCODONE-acetaminophen (PERCOCET) 5-325 mg per tablet 1 Tab 1 Tab Oral Q4H PRN    HYDROmorphone (DILAUDID) syringe 0.5 mg  0.5 mg IntraVENous Q3H PRN    ondansetron (ZOFRAN) injection 4 mg  4 mg IntraVENous Q4H PRN    cefTRIAXone (ROCEPHIN) 1 g in 0.9% sodium chloride (MBP/ADV) 50 mL MBP  1 g IntraVENous Q24H    naloxone (NARCAN) injection 0.4 mg  0.4 mg IntraVENous PRN    ibuprofen (MOTRIN) tablet 400 mg  400 mg Oral Q4H PRN           Ayla Benedict DO  Internal Medicine, Hospitalist  Pager: [phone]  [address] [place] [address]

## 2019-12-11 VITALS
RESPIRATION RATE: 16 BRPM | WEIGHT: 132 LBS | BODY MASS INDEX: 24.29 KG/M2 | SYSTOLIC BLOOD PRESSURE: 100 MMHG | TEMPERATURE: 98 F | HEIGHT: 62 IN | HEART RATE: 62 BPM | OXYGEN SATURATION: 97 % | DIASTOLIC BLOOD PRESSURE: 65 MMHG

## 2019-12-11 LAB
BACTERIA SPEC CULT: ABNORMAL
SERVICE CMNT-IMP: ABNORMAL

## 2019-12-11 PROCEDURE — 74011250636 HC RX REV CODE- 250/636: Performed by: HOSPITALIST

## 2019-12-11 PROCEDURE — 74011000258 HC RX REV CODE- 258: Performed by: HOSPITALIST

## 2019-12-11 PROCEDURE — 74011250637 HC RX REV CODE- 250/637: Performed by: INTERNAL MEDICINE

## 2019-12-11 RX ORDER — OXYCODONE AND ACETAMINOPHEN 5; 325 MG/1; MG/1
1 TABLET ORAL
Qty: 12 TAB | Refills: 0 | Status: SHIPPED | OUTPATIENT
Start: 2019-12-11 | End: 2019-12-14

## 2019-12-11 RX ORDER — LEVOFLOXACIN 750 MG/1
750 TABLET ORAL DAILY
Qty: 7 TAB | Refills: 0 | Status: SHIPPED | OUTPATIENT
Start: 2019-12-11 | End: 2019-12-18

## 2019-12-11 RX ADMIN — CEFTRIAXONE 1 G: 1 INJECTION, POWDER, FOR SOLUTION INTRAMUSCULAR; INTRAVENOUS at 10:58

## 2019-12-11 RX ADMIN — OXYCODONE HYDROCHLORIDE AND ACETAMINOPHEN 1 TABLET: 5; 325 TABLET ORAL at 08:03

## 2019-12-11 RX ADMIN — ONDANSETRON 4 MG: 2 INJECTION INTRAMUSCULAR; INTRAVENOUS at 08:00

## 2019-12-11 NOTE — PROGRESS NOTES
Discharge/Transition Planning     Problem: Discharge Planning  Goal: *Discharge to safe environment  Outcome: Resolved/Met   Home        Care Management Interventions  PCP Verified by CM: Yes(sees PCN on ship)  Mode of Transport at Discharge: Other (see comment)  Transition of Care Consult (CM Consult):  Other(home)  Current Support Network: Lives with Spouse, Own Home  Confirm Follow Up Transport: Self  Plan discussed with Pt/Family/Caregiver: Yes  Discharge Location  Discharge Placement: Home

## 2019-12-11 NOTE — PROGRESS NOTES
Problem: Falls - Risk of  Goal: *Absence of Falls  Description  Document Talya Carranza Fall Risk and appropriate interventions in the flowsheet.   Outcome: Progressing Towards Goal  Note: Fall Risk Interventions:  Mobility Interventions: Patient to call before getting OOB         Medication Interventions: Patient to call before getting OOB    Elimination Interventions: Call light in reach, Toileting schedule/hourly rounds              Problem: Patient Education: Go to Patient Education Activity  Goal: Patient/Family Education  Outcome: Progressing Towards Goal     Problem: Pain  Goal: *Control of Pain  Outcome: Progressing Towards Goal     Problem: Patient Education: Go to Patient Education Activity  Goal: Patient/Family Education  Outcome: Progressing Towards Goal     Problem: Hypotension  Goal: *Blood pressure within specified parameters  Outcome: Progressing Towards Goal  Goal: *Fluid volume balance  Outcome: Progressing Towards Goal  Goal: *Labs within defined limits  Outcome: Progressing Towards Goal     Problem: Patient Education: Go to Patient Education Activity  Goal: Patient/Family Education  Outcome: Progressing Towards Goal     Problem: Nutrition Deficit  Goal: *Optimize nutritional status  Outcome: Progressing Towards Goal

## 2019-12-11 NOTE — PROGRESS NOTES
1900  -- Bedside, Verbal and Written shift change report given to [id number] Nada  (oncoming nurse) by Andrey Black nurse). Report included the following information SBAR, Kardex, Intake/Output, MAR and Recent Results.       2200 -- PM medications administered, pt unable to tolerate, will continue to monitor. Pt vomited during medication administered, CCL Fausto at bedside. PRN pain medication visible in emesis, medication wasted and re administered after PRN N/V administered.       0000 -- Shift reassessment, pt condition unchanged, will continue to monitor.      0400 --  Shift reassessment, pt condition unchanged, will continue to monitor.         0700 -- Bedside, Verbal and Written shift change report given to [address]) by TORIE (offgoing nurse). Report included the following information SBAR, Kardex, Intake/Output, MAR and Recent Results. Skin assessment completed.

## 2019-12-11 NOTE — PROGRESS NOTES
0730 - Bedside shift change report given to Vignette (oncoming nurse) by Kami Zhu (offgoing nurse). Report included the following information SBAR, Kardex, Procedure Summary, Intake/Output, MAR and Recent Results. [address] antibiotic. 1424 - Pt discharged; discharge instructions completed w/ patient and spouse. Pt signed, however e-sign was not functioning properly and could not save signature.

## 2019-12-11 NOTE — DISCHARGE INSTRUCTIONS
Patient Education        Abdominal Pain: Care Instructions  Your Care Instructions    Abdominal pain has many possible causes. Some aren't serious and get better on their own in a few days. Others need more testing and treatment. If your pain continues or gets worse, you need to be rechecked and may need more tests to find out what is wrong. You may need surgery to correct the problem. Don't ignore new symptoms, such as fever, nausea and vomiting, urination problems, pain that gets worse, and dizziness. These may be signs of a more serious problem. Your doctor may have recommended a follow-up visit in the next 8 to 12 hours. If you are not getting better, you may need more tests or treatment. The doctor has checked you carefully, but problems can develop later. If you notice any problems or new symptoms, get medical treatment right away. Follow-up care is a key part of your treatment and safety. Be sure to make and go to all appointments, and call your doctor if you are having problems. It's also a good idea to know your test results and keep a list of the medicines you take. How can you care for yourself at home? · Rest until you feel better. · To prevent dehydration, drink plenty of fluids, enough so that your urine is light yellow or clear like water. Choose water and other caffeine-free clear liquids until you feel better. If you have kidney, heart, or liver disease and have to limit fluids, talk with your doctor before you increase the amount of fluids you drink. · If your stomach is upset, eat mild foods, such as rice, dry toast or crackers, bananas, and applesauce. Try eating several small meals instead of two or three large ones. · Wait until 48 hours after all symptoms have gone away before you have spicy foods, alcohol, and drinks that contain caffeine. · Do not eat foods that are high in fat. · Avoid anti-inflammatory medicines such as aspirin, ibuprofen (Advil, Motrin), and naproxen (Aleve). These can cause stomach upset. Talk to your doctor if you take daily aspirin for another health problem. When should you call for help? Call 911 anytime you think you may need emergency care. For example, call if:    · You passed out (lost consciousness).     · You pass maroon or very bloody stools.     · You vomit blood or what looks like coffee grounds.     · You have new, severe belly pain.    Call your doctor now or seek immediate medical care if:    · Your pain gets worse, especially if it becomes focused in one area of your belly.     · You have a new or higher fever.     · Your stools are black and look like tar, or they have streaks of blood.     · You have unexpected vaginal bleeding.     · You have symptoms of a urinary tract infection. These may include:  ? Pain when you urinate. ? Urinating more often than usual.  ? Blood in your urine.     · You are dizzy or lightheaded, or you feel like you may faint.    Watch closely for changes in your health, and be sure to contact your doctor if:    · You are not getting better after 1 day (24 hours). Where can you learn more? Go to http://maryGood Photosarah.info/. Enter O897 in the search box to learn more about \"Abdominal Pain: Care Instructions. \"  Current as of: June 26, 2019  Content Version: 12.2  © 1799-9978 [employer]. Care instructions adapted under license by [employer] (which disclaims liability or warranty for this information). If you have questions about a medical condition or this instruction, always ask your healthcare professional. [place] [address] any warranty or liability for your use of this information. Patient Education        Kidney Infection: Care Instructions  Your Care Instructions    A kidney infection (pyelonephritis) is a type of urinary tract infection, or UTI. Most UTIs are bladder infections.  Kidney infections tend to make people much sicker than bladder infections do. A kidney infection is also more serious because it can cause lasting damage if it is not treated quickly. Follow-up care is a key part of your treatment and safety. Be sure to make and go to all appointments, and call your doctor if you are having problems. It's also a good idea to know your test results and keep a list of the medicines you take. How can you care for yourself at home? · Take your antibiotics as directed. Do not stop taking them just because you feel better. You need to take the full course of antibiotics. · Drink plenty of water, enough so that your urine is light yellow or clear like water. This may help wash out bacteria that are causing the infection. If you have kidney, heart, or liver disease and have to limit fluids, talk with your doctor before you increase the amount of fluids you drink. · Urinate often. Try to empty your bladder each time. · To relieve pain, take a hot shower or lay a heating pad (set on low) over your lower belly. Never go to sleep with a heating pad in place. Put a thin cloth between the heating pad and your skin. To help prevent kidney infections  · Drink plenty of water each day. This helps you urinate often, which clears bacteria from your system. If you have kidney, heart, or liver disease and have to limit fluids, talk with your doctor before you increase the amount of fluids you drink. · Urinate when you have the urge. Do not hold your urine for a long time. Urinate before you go to sleep. · If you have symptoms of a bladder infection, such as burning when you urinate or having to urinate often, call your doctor so you can treat the problem before it gets worse. If you do not treat a bladder infection quickly, it can spread to the kidney. · Men should keep the tip of the penis clean. If you are a woman, keep these ideas in mind:  · Urinate right after you have sex. · Change sanitary pads often.  Avoid douches, feminine hygiene sprays, and other feminine hygiene products that have deodorants. · After going to the bathroom, wipe from front to back. When should you call for help? Call your doctor now or seek immediate medical care if:    · You have symptoms that a kidney infection is getting worse. These may include:  ? Pain or burning when you urinate. ? A frequent need to urinate without being able to pass much urine. ? Pain in the flank, which is just below the rib cage and above the waist on either side of the back. ? Blood in the urine. ? A fever.     · You are vomiting or nauseated.    Watch closely for changes in your health, and be sure to contact your doctor if:    · You do not get better as expected. Where can you learn more? Go to http://mary-sarah.info/. Enter A399 in the search box to learn more about \"Kidney Infection: Care Instructions. \"  Current as of: October 31, 2018  Content Version: 12.2  © 6222-8209 [employer]. Care instructions adapted under license by [employer] (which disclaims liability or warranty for this information). If you have questions about a medical condition or this instruction, always ask your healthcare professional. [place] [address] any warranty or liability for your use of this information. Patient Education        Open Nephrectomy: Before Your Surgery  What is a nephrectomy? A nephrectomy is surgery to take out part or all of the kidney. One or both kidneys may be taken out. Sometimes other tissue near the kidney is taken out at the same time. The doctor will take out your kidney through a long cut in the front or side of your belly. This cut is called an incision. The incision will leave a scar that will fade with time. Your body can work fine with one healthy kidney. But if both kidneys are removed, or if the kidney you have left is not healthy, you will need other treatment after surgery.  Your doctor will talk to you about this.  You will probably spend 3 to 5 days in the hospital. Maia Crandall will need to take it easy for 4 to 6 weeks at home. Follow-up care is a key part of your treatment and safety. Be sure to make and go to all appointments, and call your doctor if you are having problems. It's also a good idea to know your test results and keep a list of the medicines you take. What happens before surgery?   Surgery can be stressful. This information will help you understand what you can expect. And it will help you safely prepare for surgery.   Preparing for surgery    · Understand exactly what surgery is planned, along with the risks, benefits, and other options. · Tell your doctors ALL the medicines, vitamins, supplements, and herbal remedies you take. Some of these can increase the risk of bleeding or interact with anesthesia.     · If you take blood thinners, such as warfarin (Coumadin), clopidogrel (Plavix), or aspirin, be sure to talk to your doctor. He or she will tell you if you should stop taking these medicines before your surgery. Make sure that you understand exactly what your doctor wants you to do.     · Your doctor will tell you which medicines to take or stop before your surgery. You may need to stop taking certain medicines a week or more before surgery. So talk to your doctor as soon as you can.     · If you have an advance directive, let your doctor know. It may include a living will and a durable power of  for health care. Bring a copy to the hospital. If you don't have one, you may want to prepare one. It lets your doctor and loved ones know your health care wishes. Doctors advise that everyone prepare these papers before any type of surgery or procedure.     · You may need to empty your bowels with a laxative or an enema. Your doctor will tell you if you need to do this. What happens on the day of surgery? · Follow the instructions exactly about when to stop eating and drinking.  If you don't, your surgery may be canceled. If your doctor told you to take your medicines on the day of surgery, take them with only a sip of water.     · Take a bath or shower before you come in for your surgery. Do not apply lotions, perfumes, deodorants, or nail polish.     · Do not shave the surgical site yourself.     · Take off all jewelry and piercings. And take out contact lenses, if you wear them.    At the hospital or surgery center   · Bring a picture ID.     · The area for surgery is often marked to make sure there are no errors.     · You will be kept comfortable and safe by your anesthesia provider. You will be asleep during the surgery.     · You may get an epidural catheter. This is a small tube that puts pain medicine into the area in your back around your spinal cord. It helps prevent pain after surgery.     · The surgery will take about 2 to 3 hours.     · You will have a tube that drains urine from your bladder. This is called a urinary catheter.     · You may have a small tube coming out of your belly. This tube will drain fluids.     · You may have a thin plastic tube in your nose that goes down the back of your throat into your stomach. It will drain stomach juices. It is usually removed in the days after surgery. Going home   · Be sure you have someone to drive you home. Anesthesia and pain medicine make it unsafe for you to drive.     · You will be given more specific instructions about recovering from your surgery. They will cover things like diet, wound care, follow-up care, driving, and getting back to your normal routine. When should you call your doctor? · You have questions or concerns.     · You don't understand how to prepare for your surgery.     · You become ill before the surgery (such as fever, flu, or a cold).     · You need to reschedule or have changed your mind about having the surgery. Where can you learn more? Go to http://mary-sarah.info/.   Enter I953 in the search box to learn more about \"Open Nephrectomy: Before Your Surgery. \"  Current as of: October 31, 2018  Content Version: 12.2  © 1783-8935 [employer]. Care instructions adapted under license by [employer] (which disclaims liability or warranty for this information). If you have questions about a medical condition or this instruction, always ask your healthcare professional. [address] any warranty or liability for your use of this information. Patient armband removed and shredded    DISCHARGE SUMMARY from Nurse    PATIENT INSTRUCTIONS:    After general anesthesia or intravenous sedation, for 24 hours or while taking prescription Narcotics:  · Limit your activities  · Do not drive and operate hazardous machinery  · Do not make important personal or business decisions  · Do  not drink alcoholic beverages  · If you have not urinated within 8 hours after discharge, please contact your surgeon on call. Report the following to your surgeon:  · Excessive pain, swelling, redness or odor of or around the surgical area  · Temperature over 100.5  · Nausea and vomiting lasting longer than 4 hours or if unable to take medications  · Any signs of decreased circulation or nerve impairment to extremity: change in color, persistent  numbness, tingling, coldness or increase pain  · Any questions    What to do at Home:  Recommended activity: Activity as tolerated. If you experience any of the following symptoms a fever greater than 102°F (38.9°C)   pain in the abdomen, back, side, or groin   painful or burning urination   cloudy urine   pus or blood in the urine   urgent or frequent urination   fishy-smelling urine  Other symptoms can include:  shaking or chills   nausea   vomiting   general aching or ill feeling   fatigue   moist skin   mental confusion, please follow up with your Primary Care Provider or ED.     *  Please give a list of your current medications to your Primary Care Provider. *  Please update this list whenever your medications are discontinued, doses are      changed, or new medications (including over-the-counter products) are added. *  Please carry medication information at all times in case of emergency situations. These are general instructions for a healthy lifestyle:    No smoking/ No tobacco products/ Avoid exposure to second hand smoke  Surgeon General's Warning:  Quitting smoking now greatly reduces serious risk to your health. Obesity, smoking, and sedentary lifestyle greatly increases your risk for illness    A healthy diet, regular physical exercise & weight monitoring are important for maintaining a healthy lifestyle    You may be retaining fluid if you have a history of heart failure or if you experience any of the following symptoms:  Weight gain of 3 pounds or more overnight or 5 pounds in a week, increased swelling in our hands or feet or shortness of breath while lying flat in bed. Please call your doctor as soon as you notice any of these symptoms; do not wait until your next office visit. The discharge information has been reviewed with the patient. The patient verbalized understanding. Discharge medications reviewed with the patient and appropriate educational materials and side effects teaching were provided.   ___________________________________________________________________________________________________________________________________

## 2019-12-11 NOTE — DISCHARGE SUMMARY
Internal Medicine Discharge Summary        Patient: Debbie Israel    YOB: 1990    Age:  34 y.o. Admit Date: 12/9/2019    Discharge Date: 12/11/2019    LOS:  LOS: 2 days     Discharge To: Home    Consults: None    Admission Diagnoses: Sepsis ([facility].) [A41.9]    Discharge Diagnoses:    Problem List as of 12/11/2019 Never Reviewed          Codes Class Noted - Resolved    * (Principal) Sepsis ([facility].) ICD-10-CM: A41.9  ICD-9-CM: 038.9, 995.91  12/9/2019 - Present        Pyelonephritis ICD-10-CM: N12  ICD-9-CM: 590.80  12/9/2019 - Present              Discharge Condition:  Improved    Procedures: None         HPI: Debbie Israel is a 34 y.o. female with no sig PMHx who presented to the ED with new onset abd pain/flank pain and urinary urgency. She admits to back pain for the last month and has been on gabapentin for it. The abd pain/nausea and flank pain started about 3 days ago while the urgency started about a week ago. She started feeling worse over the weekend and had 100.9 fever yesterday. She denied any other symptoms. In the ED, imaging showed pyelonephritis and she was given IV antibiotics. She was septic given tachycardia and leukocytosis. Hospital Course:    Sepsis 2/2 Pyelonephritis - Treated with IV rocephin, aggressive IV fluids. Urine culture grew ecoli pan-sensitive. Leukocytosis resolved. Occasional fever but no longer persistent. Pain improved by day of discharge. Transitioned to levaquin at discharge to complete total of 10 days of treatment given complexity. Pain meds given at discharge as well. The rest of the patient's chronic conditions were managed appropriately during their admission. They were medically stable at the time of discharge.     Visit Vitals  /65 (BP 1 Location: Left arm, BP Patient Position: At rest)   Pulse 62   Temp 98 °F (36.7 °C)   Resp 16   Ht 5' 2\" (1.575 m)   Wt 59.9 kg (132 lb)   SpO2 97%   Breastfeeding No   BMI 24.14 kg/m² Physical Exam at Discharge:  General Appearance: NAD, conversant  HENT: normocephalic/atraumatic, moist mucus membranes  Lungs: CTA with normal respiratory effort  CV: RRR, no m/r/g  Abdomen: soft, mild RLQ, normal bowel sounds  Extremities: no cyanosis, no peripheral edema  Neuro: moves all extremities, no focal deficits  Psych: appropriate affect, alert and oriented to person, place and time    Labs Prior to Discharge:  Labs: Results:       Chemistry Recent Labs     12/09/19  0847   *      K 3.4*      CO2 27   BUN 7   CREA 0.87   CA 8.8   AGAP 6   BUCR 8*   AP 72   TP 7.8   ALB 4.4   GLOB 3.4   AGRAT 1.3      CBC w/Diff Recent Labs     12/10/19  0510 12/09/19  0847   WBC 8.0 16.0*   RBC 3.87* 4.63   HGB 11.4* 13.7   HCT 35.7 41.8   * 179   GRANS 70 91*   LYMPH 17* 7*   EOS 0 0      Cardiac Enzymes No results for input(s): CPK, CKND1, RUBINA in the last 72 hours. No lab exists for component: CKRMB, TROIP   Coagulation No results for input(s): PTP, INR, APTT, INREXT in the last 72 hours. Lipid Panel No results found for: CHOL, CHOLPOCT, CHOLX, CHLST, CHOLV, 743043, HDL, HDLP, LDL, LDLC, DLDLP, 321603, VLDLC, VLDL, TGLX, TRIGL, TRIGP, TGLPOCT, CHHD, CHHDX   BNP No results for input(s): BNPP in the last 72 hours. Liver Enzymes Recent Labs     12/09/19  0847   TP 7.8   ALB 4.4   AP 72   SGOT 28      Thyroid Studies No results found for: T4, T3U, TSH, TSHEXT         Significant Imaging:  Ct Abd Pelv W Cont    Result Date: 12/9/2019  EXAM: CT of the abdomen and pelvis INDICATION: Right-sided/lower abdominal pain, fever and chills COMPARISON: CT 8/24/2018 TECHNIQUE: Axial CT imaging of the abdomen and pelvis was performed with intravenous contrast. Multiplanar reformats were generated. One or more dose reduction techniques were used on this CT: automated exposure control, adjustment of the mAs and/or kVp according to patient size, and iterative reconstruction techniques.   The specific techniques used on this CT exam have been documented in the patient's electronic medical record. Digital Imaging and Communications in Medicine (DICOM) format image data are available to nonaffiliated external healthcare facilities or entities on a secure, media free, reciprocally searchable basis with patient authorization for at least a 12-month period after this study. _______________ FINDINGS: LOWER CHEST: Basilar changes of atelectasis. No alveolar consolidation. No pleural effusion. Small fissural lymph node (series 4, image 7) unchanged. Normal cardiac size. No pericardial effusion. LIVER, BILIARY: Liver is normal. No biliary dilation. Prior cholecystectomy. PANCREAS: Normal. SPLEEN: Normal. ADRENALS: Normal. KIDNEYS/URETERS/BLADDER: There are faint areas of intraparenchymal low attenuation noted involving the interpolar and upper pole regions of the right kidney there is considerable urothelial enhancement is noted without evidence of hydronephrosis or urolithiasis. Left kidney is normal in CT appearance. Urinary bladder unremarkable. PELVIC ORGANS: Intrauterine contraceptive device present in expected position. Small bilateral ovarian cysts or follicles. VASCULATURE: Unremarkable LYMPH NODES: No enlarged lymph nodes. GASTROINTESTINAL TRACT: No bowel dilation or wall thickening. Normal appendix. No morphology of bowel obstruction. No free intraperitoneal gas. BONES: No acute osseous abnormality. No suspicious lytic or blastic osseous lesions. OTHER: None. _______________     IMPRESSION: 1. Findings of right-sided pyelitis and likely early changes of pyelonephritis involving the right kidney. No evidence of hydronephrosis or urolithiasis. 2. Normal caliber small and large bowel, to include a normal appendix.      Xr Chest Port    Result Date: 12/9/2019  EXAM: XR CHEST PORT CLINICAL INDICATION/HISTORY: meets SIRS criteria -Additional: None COMPARISON: 8/24/2018; prior abdominal/pelvic CT TECHNIQUE: Frontal view of the chest _______________ FINDINGS: HEART AND MEDIASTINUM: Normal cardiac size and mediastinal contours. LUNGS AND PLEURAL SPACES: No focal pneumonic consolidation, pneumothorax, or pleural effusion. BONY THORAX AND SOFT TISSUES: No acute osseous abnormality _______________     IMPRESSION: No acute radiographic cardiopulmonary abnormality. Us Transvaginal W Doppler    Result Date: 12/9/2019  Ultrasound Pelvis: Transabdominal INDICATION: 31-year-old patient with right-sided pelvic pain. COMPARISON: CT 12/9/2019. TECHNIQUE: Real-time transvaginal sonography in multiple planes was performed with image documentation. Grayscale, color flow Doppler imaging, and velocity spectral waveform analysis of the ovaries  was performed (duplex imaging). FINDINGS: UTERUS: Normal in size and echotexture measuring  8.6 x 3.8 x 3.8 cm. No fibroids or masses identified. ENDOMETRIUM: Intrauterine contraceptive device present in expected position. 0.4 cm. RIGHT OVARY and ADNEXA: Right ovary is normal in size and echotexture measuring 3.5 x 2.8 x 1.7 cm. No ovarian or adnexal masses identified. Color and spectral Doppler demonstrate normal flow to the right ovary with no evidence of ovarian torsion. The systolic and venous waveforms are within normal limits. LEFT OVARY and ADNEXA: Left ovary is normal in size and echotexture measuring 3.0 x 2.6 x 1.8 cm. Complex appearing left ovarian structure with lacy internal septations measuring 2.0 x 1.8 x 1.9 cm. Color and spectral Doppler demonstrate normal flow to the left ovary with no evidence of ovarian torsion. The systolic and venous waveforms are within normal limits. . OTHER: No free fluid identified. IMPRESSION: 1. Normal blood flow to each ovary. No findings to suggest ovarian torsion. 2. Complex left ovarian structure, the appearance of which favors a small hemorrhagic cyst. 3. Intrauterine contraceptive device in expected position.            Discharge Medications: Current Discharge Medication List      START taking these medications    Details   levoFLOXacin (LEVAQUIN) 750 mg tablet Take 1 Tab by mouth daily for 7 days. Qty: 7 Tab, Refills: 0      oxyCODONE-acetaminophen (PERCOCET) 5-325 mg per tablet Take 1 Tab by mouth every four (4) hours as needed for Pain for up to 3 days. Max Daily Amount: 6 Tabs. Qty: 12 Tab, Refills: 0    Associated Diagnoses: Pyelonephritis         CONTINUE these medications which have NOT CHANGED    Details   sucralfate (CARAFATE) 100 mg/mL suspension Take 10 mL by mouth four (4) times daily. Qty: 414 mL, Refills: 0      famotidine (PEPCID) 20 mg tablet Take 1 Tab by mouth two (2) times a day. Qty: 20 Tab, Refills: 0      ondansetron (ZOFRAN ODT) 4 mg disintegrating tablet Take 1 Tab by mouth every eight (8) hours as needed for Nausea. Qty: 15 Tab, Refills: 0             Activity: Activity as tolerated    Diet: Resume previous diet    Wound Care: None needed    Follow-up:   Please follow up with your PCP within 7 days to discuss your recent hospitalization. Patient to arrange.          Total time spent including time spent on final examination and discharge discussion, discharge documentation and records reviewed and medication reconciliation: > 30 minutes    Baron James DO  Internal Medicine, Hospitalist  Pager: [phone]

## 2019-12-11 NOTE — PROGRESS NOTES
Problem: Falls - Risk of  Goal: *Absence of Falls  Description  Document Vianca Benavidez Fall Risk and appropriate interventions in the flowsheet.   Outcome: Progressing Towards Goal  Note: Fall Risk Interventions:  Mobility Interventions: Patient to call before getting OOB         Medication Interventions: Evaluate medications/consider consulting pharmacy    Elimination Interventions: Call light in reach              Problem: Pain  Goal: *Control of Pain  Outcome: Progressing Towards Goal     Problem: Hypotension  Goal: *Blood pressure within specified parameters  Outcome: Progressing Towards Goal  Goal: *Fluid volume balance  Outcome: Progressing Towards Goal  Goal: *Labs within defined limits  Outcome: Progressing Towards Goal     Problem: Nutrition Deficit  Goal: *Optimize nutritional status  Outcome: Progressing Towards Goal

## 2019-12-11 NOTE — PROGRESS NOTES
12/11/2019      RE: Sameer Mitchell      To Whom it May Concern: This is to certify that Emily Palacios has been hospitalized at [facility] from 12/9/19 to 12/11/19. Please feel free to contact my office if you have any questions or concerns. Thank you for your assistance in this matter.     Sincerely,      Fidelia Ybarra, DIXON   [phone]

## 2019-12-15 LAB
BACTERIA SPEC CULT: NORMAL
BACTERIA SPEC CULT: NORMAL
SERVICE CMNT-IMP: NORMAL
SERVICE CMNT-IMP: NORMAL